# Patient Record
Sex: MALE | Race: WHITE | Employment: FULL TIME | URBAN - METROPOLITAN AREA
[De-identification: names, ages, dates, MRNs, and addresses within clinical notes are randomized per-mention and may not be internally consistent; named-entity substitution may affect disease eponyms.]

---

## 2017-05-01 RX ORDER — ALBUTEROL SULFATE 90 UG/1
2 AEROSOL, METERED RESPIRATORY (INHALATION) EVERY 6 HOURS PRN
Qty: 1 INHALER | Refills: 3 | Status: SHIPPED | OUTPATIENT
Start: 2017-05-01 | End: 2017-11-21 | Stop reason: SDUPTHER

## 2017-05-02 RX ORDER — ALBUTEROL SULFATE 2.5 MG/3ML
2.5 SOLUTION RESPIRATORY (INHALATION) EVERY 4 HOURS PRN
Qty: 30 EACH | Refills: 1 | Status: SHIPPED | OUTPATIENT
Start: 2017-05-02 | End: 2018-11-16

## 2017-06-07 ENCOUNTER — OFFICE VISIT (OUTPATIENT)
Dept: FAMILY MEDICINE CLINIC | Age: 32
End: 2017-06-07
Payer: COMMERCIAL

## 2017-06-07 ENCOUNTER — HOSPITAL ENCOUNTER (OUTPATIENT)
Age: 32
Discharge: HOME | End: 2017-06-07
Payer: COMMERCIAL

## 2017-06-07 DIAGNOSIS — Z91.09 ENVIRONMENTAL ALLERGIES: ICD-10-CM

## 2017-06-07 DIAGNOSIS — J45.40 MODERATE PERSISTENT ASTHMA WITHOUT COMPLICATION: Primary | ICD-10-CM

## 2017-06-07 DIAGNOSIS — J45.42 MODERATE PERSISTENT ASTHMA WITH STATUS ASTHMATICUS: ICD-10-CM

## 2017-06-07 LAB
ABSOLUTE EOS #: 0.3 K/UL (ref 0–0.4)
ABSOLUTE LYMPH #: 1.4 K/UL (ref 0.9–2.5)
ABSOLUTE MONO #: 0.6 K/UL (ref 0–1)
ALBUMIN SERPL-MCNC: 4.5 G/DL (ref 3.5–5.2)
ALBUMIN/GLOBULIN RATIO: ABNORMAL (ref 1–2.5)
ALP BLD-CCNC: 62 U/L (ref 40–129)
ALT SERPL-CCNC: 53 U/L (ref 5–41)
ANION GAP SERPL CALCULATED.3IONS-SCNC: 13 MMOL/L (ref 9–17)
AST SERPL-CCNC: 37 U/L
BASOPHILS # BLD: 0 %
BASOPHILS ABSOLUTE: 0 K/UL (ref 0–0.2)
BILIRUB SERPL-MCNC: 0.24 MG/DL (ref 0.3–1.2)
BUN BLDV-MCNC: 11 MG/DL (ref 6–20)
BUN/CREAT BLD: 11 (ref 9–20)
CALCIUM SERPL-MCNC: 10.1 MG/DL (ref 8.6–10.4)
CHLORIDE BLD-SCNC: 102 MMOL/L (ref 98–107)
CO2: 25 MMOL/L (ref 20–31)
CREAT SERPL-MCNC: 0.99 MG/DL (ref 0.7–1.2)
DIFFERENTIAL TYPE: YES
EOSINOPHILS RELATIVE PERCENT: 5 %
GFR AFRICAN AMERICAN: >60 ML/MIN
GFR NON-AFRICAN AMERICAN: >60 ML/MIN
GFR SERPL CREATININE-BSD FRML MDRD: ABNORMAL ML/MIN/{1.73_M2}
GFR SERPL CREATININE-BSD FRML MDRD: ABNORMAL ML/MIN/{1.73_M2}
GLUCOSE BLD-MCNC: 101 MG/DL (ref 70–99)
HCT VFR BLD CALC: 48 % (ref 41–53)
HEMOGLOBIN: 16.1 G/DL (ref 13.5–17.5)
LYMPHOCYTES # BLD: 18 %
MCH RBC QN AUTO: 30.6 PG (ref 26–34)
MCHC RBC AUTO-ENTMCNC: 33.6 G/DL (ref 31–37)
MCV RBC AUTO: 91.2 FL (ref 80–100)
MONOCYTES # BLD: 8 %
PDW BLD-RTO: 13.4 % (ref 12.1–15.2)
PLATELET # BLD: 198 K/UL (ref 140–450)
PLATELET ESTIMATE: NORMAL
PMV BLD AUTO: NORMAL FL (ref 6–12)
POTASSIUM SERPL-SCNC: 4.1 MMOL/L (ref 3.7–5.3)
RBC # BLD: 5.27 M/UL (ref 4.5–5.9)
RBC # BLD: NORMAL 10*6/UL
SEG NEUTROPHILS: 69 %
SEGMENTED NEUTROPHILS ABSOLUTE COUNT: 5.1 K/UL (ref 2.1–6.5)
SODIUM BLD-SCNC: 140 MMOL/L (ref 135–144)
TOTAL PROTEIN: 7.5 G/DL (ref 6.4–8.3)
WBC # BLD: 7.5 K/UL (ref 3.5–11)
WBC # BLD: NORMAL 10*3/UL

## 2017-06-07 PROCEDURE — 80053 COMPREHEN METABOLIC PANEL: CPT

## 2017-06-07 PROCEDURE — 36415 COLL VENOUS BLD VENIPUNCTURE: CPT

## 2017-06-07 PROCEDURE — 99214 OFFICE O/P EST MOD 30 MIN: CPT | Performed by: NURSE PRACTITIONER

## 2017-06-07 PROCEDURE — 85025 COMPLETE CBC W/AUTO DIFF WBC: CPT

## 2017-06-07 RX ORDER — MONTELUKAST SODIUM 10 MG/1
10 TABLET ORAL DAILY
Qty: 30 TABLET | Refills: 0 | Status: SHIPPED | OUTPATIENT
Start: 2017-06-07 | End: 2017-07-14 | Stop reason: SDUPTHER

## 2017-06-07 ASSESSMENT — ENCOUNTER SYMPTOMS
SINUS PRESSURE: 1
SORE THROAT: 1
COUGH: 1
HOARSE VOICE: 0
SHORTNESS OF BREATH: 1

## 2017-06-08 VITALS
SYSTOLIC BLOOD PRESSURE: 132 MMHG | HEIGHT: 71 IN | TEMPERATURE: 98.2 F | BODY MASS INDEX: 38.08 KG/M2 | HEART RATE: 88 BPM | OXYGEN SATURATION: 96 % | WEIGHT: 272 LBS | DIASTOLIC BLOOD PRESSURE: 84 MMHG

## 2017-06-08 ASSESSMENT — ENCOUNTER SYMPTOMS
SPUTUM PRODUCTION: 0
HEMOPTYSIS: 0

## 2017-06-16 ENCOUNTER — HOSPITAL ENCOUNTER (OUTPATIENT)
Dept: PULMONOLOGY | Age: 32
Discharge: HOME | End: 2017-06-16
Payer: COMMERCIAL

## 2017-06-16 VITALS — OXYGEN SATURATION: 98 %

## 2017-06-16 DIAGNOSIS — J45.40 MODERATE PERSISTENT ASTHMA WITHOUT COMPLICATION: ICD-10-CM

## 2017-06-16 PROCEDURE — 94060 EVALUATION OF WHEEZING: CPT

## 2017-06-16 PROCEDURE — 94060 EVALUATION OF WHEEZING: CPT | Performed by: INTERNAL MEDICINE

## 2017-06-16 PROCEDURE — 6360000002 HC RX W HCPCS: Performed by: NURSE PRACTITIONER

## 2017-06-16 PROCEDURE — 94726 PLETHYSMOGRAPHY LUNG VOLUMES: CPT

## 2017-06-16 PROCEDURE — 94729 DIFFUSING CAPACITY: CPT | Performed by: INTERNAL MEDICINE

## 2017-06-16 PROCEDURE — 94729 DIFFUSING CAPACITY: CPT

## 2017-06-16 RX ORDER — ALBUTEROL SULFATE 2.5 MG/3ML
2.5 SOLUTION RESPIRATORY (INHALATION) ONCE
Status: COMPLETED | OUTPATIENT
Start: 2017-06-16 | End: 2017-06-16

## 2017-06-16 RX ADMIN — ALBUTEROL SULFATE 2.5 MG: 2.5 SOLUTION RESPIRATORY (INHALATION) at 12:58

## 2017-06-20 ENCOUNTER — TELEPHONE (OUTPATIENT)
Dept: FAMILY MEDICINE CLINIC | Age: 32
End: 2017-06-20

## 2017-06-21 RX ORDER — FLUTICASONE FUROATE AND VILANTEROL 100; 25 UG/1; UG/1
1 POWDER RESPIRATORY (INHALATION) DAILY
Qty: 90 EACH | Refills: 0 | Status: SHIPPED | OUTPATIENT
Start: 2017-06-21 | End: 2017-09-26 | Stop reason: SDUPTHER

## 2017-06-30 ENCOUNTER — OFFICE VISIT (OUTPATIENT)
Dept: FAMILY MEDICINE CLINIC | Age: 32
End: 2017-06-30
Payer: COMMERCIAL

## 2017-06-30 VITALS
HEART RATE: 90 BPM | SYSTOLIC BLOOD PRESSURE: 126 MMHG | DIASTOLIC BLOOD PRESSURE: 82 MMHG | HEIGHT: 71 IN | WEIGHT: 273 LBS | OXYGEN SATURATION: 96 % | BODY MASS INDEX: 38.22 KG/M2 | TEMPERATURE: 98.3 F

## 2017-06-30 DIAGNOSIS — J45.30 MILD PERSISTENT ASTHMA WITHOUT COMPLICATION: Primary | ICD-10-CM

## 2017-06-30 DIAGNOSIS — J43.1 PANLOBULAR EMPHYSEMA (HCC): ICD-10-CM

## 2017-06-30 DIAGNOSIS — Z23 NEED FOR 23-POLYVALENT PNEUMOCOCCAL POLYSACCHARIDE VACCINE: ICD-10-CM

## 2017-06-30 PROCEDURE — 90471 IMMUNIZATION ADMIN: CPT | Performed by: NURSE PRACTITIONER

## 2017-06-30 PROCEDURE — 90732 PPSV23 VACC 2 YRS+ SUBQ/IM: CPT | Performed by: NURSE PRACTITIONER

## 2017-06-30 PROCEDURE — 99214 OFFICE O/P EST MOD 30 MIN: CPT | Performed by: NURSE PRACTITIONER

## 2017-06-30 ASSESSMENT — ENCOUNTER SYMPTOMS
COUGH: 0
DIFFICULTY BREATHING: 0
SORE THROAT: 0
CHEST TIGHTNESS: 0
RHINORRHEA: 0
SHORTNESS OF BREATH: 0
WHEEZING: 1

## 2017-06-30 ASSESSMENT — COPD QUESTIONNAIRES: COPD: 1

## 2017-07-14 DIAGNOSIS — Z91.09 ENVIRONMENTAL ALLERGIES: ICD-10-CM

## 2017-07-14 RX ORDER — MONTELUKAST SODIUM 10 MG/1
TABLET ORAL
Qty: 30 TABLET | Refills: 5 | Status: SHIPPED | OUTPATIENT
Start: 2017-07-14 | End: 2017-09-06 | Stop reason: SDUPTHER

## 2017-09-06 DIAGNOSIS — Z91.09 ENVIRONMENTAL ALLERGIES: ICD-10-CM

## 2017-09-06 RX ORDER — MONTELUKAST SODIUM 10 MG/1
10 TABLET ORAL NIGHTLY
Qty: 90 TABLET | Refills: 1 | Status: SHIPPED | OUTPATIENT
Start: 2017-09-06 | End: 2019-09-19 | Stop reason: SDUPTHER

## 2017-09-26 RX ORDER — FLUTICASONE FUROATE AND VILANTEROL 100; 25 UG/1; UG/1
1 POWDER RESPIRATORY (INHALATION) DAILY
Qty: 90 EACH | Refills: 0 | Status: SHIPPED | OUTPATIENT
Start: 2017-09-26 | End: 2018-01-17 | Stop reason: SDUPTHER

## 2017-11-20 NOTE — TELEPHONE ENCOUNTER
Patient left message asking for a new script for [employer] - patient uses  [address] Maintenance   Topic Date Due    HIV screen  12/20/1998    DTaP/Tdap/Td vaccine (1 - Tdap) 12/20/2002    Flu vaccine (1) 09/01/2017    Pneumococcal med risk  Completed             (applicable per patient's age: Cancer Screenings, Depression Screening, Fall Risk Screening, Immunizations)    AST (U/L)   Date Value   06/07/2017 37     ALT (U/L)   Date Value   06/07/2017 53 (H)     BUN (mg/dL)   Date Value   06/07/2017 11      (goal A1C is < 7)   (goal LDL is <100) need 30-50% reduction from baseline     BP Readings from Last 3 Encounters:   06/30/17 126/82   06/07/17 132/84   09/13/16 128/88    (goal /80)      All Future Testing planned in [facility]:      Next Visit Date:  Future Appointments  Date Time Provider Joselyn Alejandre   12/29/2017 4:00 PM Russ Mckee CNP [facility]            Patient Active Problem List:     Asthma

## 2017-11-21 RX ORDER — ALBUTEROL SULFATE 90 UG/1
2 AEROSOL, METERED RESPIRATORY (INHALATION) EVERY 6 HOURS PRN
Qty: 3 INHALER | Refills: 1 | Status: SHIPPED | OUTPATIENT
Start: 2017-11-21 | End: 2018-01-26 | Stop reason: SDUPTHER

## 2017-12-04 ENCOUNTER — TELEPHONE (OUTPATIENT)
Dept: FAMILY MEDICINE CLINIC | Age: 32
End: 2017-12-04

## 2017-12-04 NOTE — TELEPHONE ENCOUNTER
Patient is asking for a Jay Dyana. He states that he has a cough and congestion. Patient hasnt been seen since 17. Please advise.       Drug [address] Maintenance   Topic Date Due    HIV screen  1998    DTaP/Tdap/Td vaccine (1 - Tdap) 2002    Flu vaccine (1) 2017    Pneumococcal med risk  Completed             (applicable per patient's age: Cancer Screenings, Depression Screening, Fall Risk Screening, Immunizations)    AST (U/L)   Date Value   2017 37     ALT (U/L)   Date Value   2017 53 (H)     BUN (mg/dL)   Date Value   2017 11      (goal A1C is < 7)   (goal LDL is <100) need 30-50% reduction from baseline     BP Readings from Last 3 Encounters:   17 126/82   17 132/84   16 128/88    (goal /80)      All Future Testing planned in [facility]:      Next Visit Date:  Future Appointments  Date Time Provider Joselyn Alejandre   2017 4:00 PM Anastacia Whelan CNP [facility]            Patient Active Problem List:     Asthma

## 2018-01-17 RX ORDER — FLUTICASONE FUROATE AND VILANTEROL 100; 25 UG/1; UG/1
1 POWDER RESPIRATORY (INHALATION) DAILY
Qty: 3 EACH | Refills: 1 | Status: SHIPPED | OUTPATIENT
Start: 2018-01-17 | End: 2018-09-05 | Stop reason: SDUPTHER

## 2018-01-26 ENCOUNTER — OFFICE VISIT (OUTPATIENT)
Dept: FAMILY MEDICINE CLINIC | Age: 33
End: 2018-01-26
Payer: COMMERCIAL

## 2018-01-26 VITALS
OXYGEN SATURATION: 97 % | TEMPERATURE: 97.9 F | SYSTOLIC BLOOD PRESSURE: 124 MMHG | BODY MASS INDEX: 40.31 KG/M2 | HEART RATE: 86 BPM | DIASTOLIC BLOOD PRESSURE: 88 MMHG | WEIGHT: 289 LBS

## 2018-01-26 DIAGNOSIS — J45.20 MILD INTERMITTENT ASTHMA WITHOUT COMPLICATION: ICD-10-CM

## 2018-01-26 PROCEDURE — 99214 OFFICE O/P EST MOD 30 MIN: CPT | Performed by: NURSE PRACTITIONER

## 2018-01-26 RX ORDER — ALBUTEROL SULFATE 90 UG/1
2 AEROSOL, METERED RESPIRATORY (INHALATION) EVERY 6 HOURS PRN
Qty: 3 INHALER | Refills: 1 | Status: SHIPPED | OUTPATIENT
Start: 2018-01-26 | End: 2018-06-11 | Stop reason: SDUPTHER

## 2018-01-26 ASSESSMENT — ENCOUNTER SYMPTOMS
VOMITING: 0
NAUSEA: 0
DIARRHEA: 0
ORTHOPNEA: 0
CHEST TIGHTNESS: 0
SHORTNESS OF BREATH: 0
COUGH: 0
WHEEZING: 0

## 2018-01-26 ASSESSMENT — PATIENT HEALTH QUESTIONNAIRE - PHQ9
SUM OF ALL RESPONSES TO PHQ QUESTIONS 1-9: 0
SUM OF ALL RESPONSES TO PHQ9 QUESTIONS 1 & 2: 0
2. FEELING DOWN, DEPRESSED OR HOPELESS: 0
1. LITTLE INTEREST OR PLEASURE IN DOING THINGS: 0

## 2018-01-26 ASSESSMENT — COPD QUESTIONNAIRES: COPD: 1

## 2018-01-26 NOTE — PROGRESS NOTES
HPI Notes    Name: Beth Mustafa  : 1985         Chief Complaint:     Chief Complaint   Patient presents with    COPD     Patient here today for check up for his COPD, doing well with his breo. History of Present Illness:        COPD   There is no chest tightness, cough, shortness of breath or wheezing. This is a chronic problem. The current episode started more than 1 month ago. The problem occurs intermittently. The problem has been resolved. Pertinent negatives include no chest pain, dyspnea on exertion, fever or nasal congestion. His symptoms are aggravated by strenuous activity. His symptoms are alleviated by steroid inhaler. He reports significant improvement on treatment. His past medical history is significant for COPD. Past Medical History:     Past Medical History:   Diagnosis Date    Asthma     Chickenpox     Osteoarthritis     Tuberculosis       Reviewed all health maintenance requirements and ordered appropriate tests  Health Maintenance Due   Topic Date Due    HIV screen  1998    DTaP/Tdap/Td vaccine (1 - Tdap) 2002       Past Surgical History:     Past Surgical History:   Procedure Laterality Date    HERNIA REPAIR      TONSILLECTOMY AND ADENOIDECTOMY          Medications:       Prior to Admission medications    Medication Sig Start Date End Date Taking?  Authorizing Provider   albuterol sulfate HFA (PROAIR HFA) 108 (90 Base) MCG/ACT inhaler Inhale 2 puffs into the lungs every 6 hours as needed for Wheezing 18  Yes Marlon Costa CNP   fluticasone-vilanterol (BREO ELLIPTA) 100-25 MCG/INH AEPB inhaler Inhale 1 puff into the lungs daily 18  Yes Marlon Costa CNP   montelukast (SINGULAIR) 10 MG tablet Take 1 tablet by mouth nightly 17  Yes Marti Le CNP   albuterol (PROVENTIL) (2.5 MG/3ML) 0.083% nebulizer solution Take 3 mLs by nebulization every 4 hours as needed for Wheezing 17   Nile Shearer DO        Allergies: Review of patient's allergies indicates no known allergies. Social History:     Tobacco:    reports that he has never smoked. He has quit using smokeless tobacco. His smokeless tobacco use included Chew. Alcohol:      reports that he drinks alcohol. Drug Use:  reports that he does not use drugs. Family History:     Family History   Problem Relation Age of Onset    Adopted: Yes       Review of Systems:         Review of Systems   Constitutional: Negative for chills and fever. Respiratory: Negative for cough, shortness of breath and wheezing. Cardiovascular: Negative for chest pain, dyspnea on exertion, palpitations and orthopnea. Gastrointestinal: Negative for diarrhea, nausea and vomiting. Physical Exam:     Vitals:  /88   Pulse 86   Temp 97.9 °F (36.6 °C) (Oral)   Wt 289 lb (131.1 kg)   SpO2 97%    L/min   BMI 40.31 kg/m²       Physical Exam   Constitutional: Vital signs are normal. He appears well-developed and well-nourished. He is cooperative. Cardiovascular: Normal rate, regular rhythm, S1 normal and S2 normal.    Pulmonary/Chest: Effort normal and breath sounds normal. No respiratory distress. Skin: Skin is warm, dry and intact. Nursing note and vitals reviewed.             Data:     Lab Results   Component Value Date     06/07/2017    K 4.1 06/07/2017     06/07/2017    CO2 25 06/07/2017    BUN 11 06/07/2017    CREATININE 0.99 06/07/2017    GLUCOSE 101 06/07/2017    PROT 7.5 06/07/2017    LABALBU 4.5 06/07/2017    BILITOT 0.24 06/07/2017    ALKPHOS 62 06/07/2017    AST 37 06/07/2017    ALT 53 06/07/2017     Lab Results   Component Value Date    WBC 7.5 06/07/2017    RBC 5.27 06/07/2017    HGB 16.1 06/07/2017    HCT 48.0 06/07/2017    MCV 91.2 06/07/2017    MCH 30.6 06/07/2017    MCHC 33.6 06/07/2017    RDW 13.4 06/07/2017     06/07/2017    MPV NOT REPORTED 06/07/2017     No results found for: TSH  No results found for: CHOL, HDL, PSA, adherence  Reviewed prior labs and health maintenance. Continue current medications, diet and exercise. Discussed use, benefit, and side effects of prescribed medications. Barriers to medication compliance addressed. Patient given educational materials - see patient instructions. All patient questions answered. Patient voiced understanding.

## 2018-06-11 RX ORDER — ALBUTEROL SULFATE 90 UG/1
2 AEROSOL, METERED RESPIRATORY (INHALATION) EVERY 6 HOURS PRN
Qty: 3 INHALER | Refills: 1 | Status: SHIPPED | OUTPATIENT
Start: 2018-06-11 | End: 2018-11-16 | Stop reason: SDUPTHER

## 2018-09-05 RX ORDER — FLUTICASONE FUROATE AND VILANTEROL 100; 25 UG/1; UG/1
1 POWDER RESPIRATORY (INHALATION) DAILY
Qty: 3 EACH | Refills: 0 | Status: SHIPPED | OUTPATIENT
Start: 2018-09-05 | End: 2018-12-03 | Stop reason: SDUPTHER

## 2018-09-25 ENCOUNTER — OFFICE VISIT (OUTPATIENT)
Dept: FAMILY MEDICINE CLINIC | Age: 33
End: 2018-09-25
Payer: COMMERCIAL

## 2018-09-25 VITALS
BODY MASS INDEX: 39.62 KG/M2 | DIASTOLIC BLOOD PRESSURE: 82 MMHG | HEART RATE: 92 BPM | SYSTOLIC BLOOD PRESSURE: 138 MMHG | WEIGHT: 283 LBS | TEMPERATURE: 99.3 F | HEIGHT: 71 IN | OXYGEN SATURATION: 96 %

## 2018-09-25 DIAGNOSIS — J02.8 ACUTE PHARYNGITIS DUE TO OTHER SPECIFIED ORGANISMS: Primary | ICD-10-CM

## 2018-09-25 PROCEDURE — 99213 OFFICE O/P EST LOW 20 MIN: CPT | Performed by: FAMILY MEDICINE

## 2018-09-25 RX ORDER — AZITHROMYCIN 250 MG/1
TABLET, FILM COATED ORAL
Qty: 1 PACKET | Refills: 0 | Status: SHIPPED | OUTPATIENT
Start: 2018-09-25 | End: 2019-03-14 | Stop reason: ALTCHOICE

## 2018-09-25 ASSESSMENT — ENCOUNTER SYMPTOMS
COUGH: 0
WHEEZING: 0
VOMITING: 0
NAUSEA: 0
SORE THROAT: 1
SHORTNESS OF BREATH: 0
FACIAL SWELLING: 0
EYE DISCHARGE: 0
EYE REDNESS: 0

## 2018-09-25 NOTE — PROGRESS NOTES
MG/3ML) 0.083% nebulizer solution Take 3 mLs by nebulization every 4 hours as needed for Wheezing 5/2/17   Rosalina Shearer, DO        Allergies:       Patient has no known allergies. Social History:     Tobacco:    reports that he has never smoked. He has quit using smokeless tobacco. His smokeless tobacco use included Chew. Alcohol:      reports that he drinks alcohol. Drug Use:  reports that he does not use drugs. Family History:     Family History   Problem Relation Age of Onset    Adopted: Yes       Review of Systems:       Review of Systems   Constitutional: Negative for chills and fever. HENT: Positive for sore throat. Negative for congestion and facial swelling. Eyes: Negative for discharge and redness. Respiratory: Negative for cough, shortness of breath and wheezing. Gastrointestinal: Negative for nausea and vomiting. Skin: Negative for rash. Neurological: Negative for headaches. Physical Exam:     Physical Exam   Constitutional: He appears well-developed and well-nourished. HENT:   Head: Normocephalic and atraumatic. Nose: Mucosal edema present. Right sinus exhibits no maxillary sinus tenderness and no frontal sinus tenderness. Left sinus exhibits no maxillary sinus tenderness and no frontal sinus tenderness. Eyes: Conjunctivae are normal. Right eye exhibits no discharge. Left eye exhibits no discharge. Neck: Neck supple. Pulmonary/Chest: Effort normal and breath sounds normal. No respiratory distress. He has no wheezes. Lymphadenopathy:     He has no cervical adenopathy. Skin: No rash noted. No erythema. Vitals reviewed.       Vitals:  /82   Pulse 92   Temp 99.3 °F (37.4 °C)   Ht 5' 11\" (1.803 m)   Wt 283 lb (128.4 kg)   SpO2 96%   BMI 39.47 kg/m²       Data:     Lab Results   Component Value Date     06/07/2017    K 4.1 06/07/2017     06/07/2017    CO2 25 06/07/2017    BUN 11 06/07/2017    CREATININE 0.99 06/07/2017    GLUCOSE 101 06/07/2017    PROT 7.5 06/07/2017    LABALBU 4.5 06/07/2017    BILITOT 0.24 06/07/2017    ALKPHOS 62 06/07/2017    AST 37 06/07/2017    ALT 53 06/07/2017     Lab Results   Component Value Date    WBC 7.5 06/07/2017    RBC 5.27 06/07/2017    HGB 16.1 06/07/2017    HCT 48.0 06/07/2017    MCV 91.2 06/07/2017    MCH 30.6 06/07/2017    MCHC 33.6 06/07/2017    RDW 13.4 06/07/2017     06/07/2017    MPV NOT REPORTED 06/07/2017     No results found for: TSH  No results found for: CHOL, HDL, PSA, LABA1C       Assessment/Plan:       1. Acute pharyngitis due to other specified organisms  Take all antibiotics, increase rest and fluids. F/U 4-5d if not better or sooner if worse. All questions answered. Pt also told to try take claritin daily for 3-4d          No Follow-up on file.       Electronically signed by Altaf Ortiz MD on 9/25/2018 at 4:36 PM

## 2018-09-25 NOTE — PATIENT INSTRUCTIONS
SURVEY:    You may be receiving a survey from [employer] regarding your visit today. Please complete the survey to enable us to provide the highest quality of care to you and your family. If you cannot score us a very good on any question, please call the office to discuss how we could have made your experience a very good one. Thank you.

## 2018-11-16 RX ORDER — ALBUTEROL SULFATE 90 UG/1
2 AEROSOL, METERED RESPIRATORY (INHALATION) EVERY 6 HOURS PRN
Qty: 3 INHALER | Refills: 1 | Status: SHIPPED | OUTPATIENT
Start: 2018-11-16 | End: 2019-07-15 | Stop reason: SDUPTHER

## 2018-12-03 RX ORDER — FLUTICASONE FUROATE AND VILANTEROL 100; 25 UG/1; UG/1
1 POWDER RESPIRATORY (INHALATION) DAILY
Qty: 3 EACH | Refills: 0 | Status: SHIPPED | OUTPATIENT
Start: 2018-12-03 | End: 2019-02-19 | Stop reason: SDUPTHER

## 2019-02-19 RX ORDER — FLUTICASONE FUROATE AND VILANTEROL 100; 25 UG/1; UG/1
1 POWDER RESPIRATORY (INHALATION) DAILY
Qty: 3 EACH | Refills: 1 | Status: SHIPPED | OUTPATIENT
Start: 2019-02-19 | End: 2019-06-17 | Stop reason: SDUPTHER

## 2019-03-14 ENCOUNTER — OFFICE VISIT (OUTPATIENT)
Dept: FAMILY MEDICINE CLINIC | Age: 34
End: 2019-03-14
Payer: COMMERCIAL

## 2019-03-14 VITALS
DIASTOLIC BLOOD PRESSURE: 86 MMHG | OXYGEN SATURATION: 97 % | WEIGHT: 286 LBS | BODY MASS INDEX: 39.89 KG/M2 | TEMPERATURE: 97.8 F | SYSTOLIC BLOOD PRESSURE: 138 MMHG | HEART RATE: 78 BPM

## 2019-03-14 DIAGNOSIS — J45.30 MILD PERSISTENT ASTHMA WITHOUT COMPLICATION: Primary | ICD-10-CM

## 2019-03-14 PROCEDURE — 99214 OFFICE O/P EST MOD 30 MIN: CPT | Performed by: NURSE PRACTITIONER

## 2019-03-14 ASSESSMENT — PATIENT HEALTH QUESTIONNAIRE - PHQ9
2. FEELING DOWN, DEPRESSED OR HOPELESS: 0
SUM OF ALL RESPONSES TO PHQ QUESTIONS 1-9: 0
1. LITTLE INTEREST OR PLEASURE IN DOING THINGS: 0
SUM OF ALL RESPONSES TO PHQ QUESTIONS 1-9: 0
SUM OF ALL RESPONSES TO PHQ9 QUESTIONS 1 & 2: 0

## 2019-03-14 ASSESSMENT — ENCOUNTER SYMPTOMS
COUGH: 0
SHORTNESS OF BREATH: 0
VOMITING: 0
NAUSEA: 0
DIARRHEA: 0
SPUTUM PRODUCTION: 0

## 2019-06-17 RX ORDER — FLUTICASONE FUROATE AND VILANTEROL 100; 25 UG/1; UG/1
1 POWDER RESPIRATORY (INHALATION) DAILY
Qty: 3 EACH | Refills: 1 | Status: SHIPPED | OUTPATIENT
Start: 2019-06-17 | End: 2019-09-19 | Stop reason: SDUPTHER

## 2019-07-15 ENCOUNTER — TELEPHONE (OUTPATIENT)
Dept: FAMILY MEDICINE CLINIC | Age: 34
End: 2019-07-15

## 2019-07-15 RX ORDER — ALBUTEROL SULFATE 90 UG/1
2 AEROSOL, METERED RESPIRATORY (INHALATION) EVERY 6 HOURS PRN
Qty: 3 INHALER | Refills: 1 | Status: SHIPPED | OUTPATIENT
Start: 2019-07-15 | End: 2020-03-12 | Stop reason: SDUPTHER

## 2019-07-15 NOTE — TELEPHONE ENCOUNTER
Patient called in asking for albuterol refill    [facility]     Pending    Last ov 03/2019    Future 09/2019    Health Maintenance   Topic Date Due    HIV screen  12/20/1998    DTaP/Tdap/Td vaccine (1 - Tdap) 12/20/2002    Flu vaccine (1) 03/14/2020 (Originally 9/1/2019)    Pneumococcal 0-64 years Vaccine  Completed             (applicable per patient's age: Cancer Screenings, Depression Screening, Fall Risk Screening, Immunizations)    AST (U/L)   Date Value   06/07/2017 37     ALT (U/L)   Date Value   06/07/2017 53 (H)     BUN (mg/dL)   Date Value   06/07/2017 11      (goal A1C is < 7)   (goal LDL is <100) need 30-50% reduction from baseline     BP Readings from Last 3 Encounters:   03/14/19 138/86   09/25/18 138/82   01/26/18 124/88    (goal /80)      All Future Testing planned in [facility]:      Next Visit Date:  Future Appointments   Date Time Provider Joselyn Alejandre   9/19/2019  3:40 PM ELIZABETH Smith - [facility]            Patient Active Problem List:     Asthma     Mild intermittent asthma without complication

## 2019-09-19 ENCOUNTER — OFFICE VISIT (OUTPATIENT)
Dept: FAMILY MEDICINE CLINIC | Age: 34
End: 2019-09-19
Payer: COMMERCIAL

## 2019-09-19 VITALS
HEART RATE: 88 BPM | DIASTOLIC BLOOD PRESSURE: 80 MMHG | BODY MASS INDEX: 38.91 KG/M2 | SYSTOLIC BLOOD PRESSURE: 132 MMHG | TEMPERATURE: 98.4 F | OXYGEN SATURATION: 97 % | WEIGHT: 279 LBS

## 2019-09-19 DIAGNOSIS — Z91.09 ENVIRONMENTAL ALLERGIES: ICD-10-CM

## 2019-09-19 DIAGNOSIS — J45.30 MILD PERSISTENT ASTHMA WITHOUT COMPLICATION: Primary | ICD-10-CM

## 2019-09-19 PROCEDURE — 96372 THER/PROPH/DIAG INJ SC/IM: CPT | Performed by: NURSE PRACTITIONER

## 2019-09-19 PROCEDURE — 99214 OFFICE O/P EST MOD 30 MIN: CPT | Performed by: NURSE PRACTITIONER

## 2019-09-19 RX ORDER — TRIAMCINOLONE ACETONIDE 40 MG/ML
40 INJECTION, SUSPENSION INTRA-ARTICULAR; INTRAMUSCULAR ONCE
Status: COMPLETED | OUTPATIENT
Start: 2019-09-19 | End: 2019-09-19

## 2019-09-19 RX ORDER — FLUTICASONE FUROATE AND VILANTEROL 100; 25 UG/1; UG/1
1 POWDER RESPIRATORY (INHALATION) DAILY
Qty: 60 EACH | Refills: 2 | Status: SHIPPED | OUTPATIENT
Start: 2019-09-19 | End: 2020-03-12 | Stop reason: SDUPTHER

## 2019-09-19 RX ORDER — MONTELUKAST SODIUM 10 MG/1
10 TABLET ORAL NIGHTLY
Qty: 90 TABLET | Refills: 1 | Status: SHIPPED | OUTPATIENT
Start: 2019-09-19 | End: 2020-03-12 | Stop reason: SDUPTHER

## 2019-09-19 RX ADMIN — TRIAMCINOLONE ACETONIDE 40 MG: 40 INJECTION, SUSPENSION INTRA-ARTICULAR; INTRAMUSCULAR at 16:29

## 2019-09-19 ASSESSMENT — ENCOUNTER SYMPTOMS
NAUSEA: 0
COUGH: 0
SHORTNESS OF BREATH: 1
DIARRHEA: 0
VOMITING: 0

## 2019-09-19 NOTE — PROGRESS NOTES
No results found for: TSH  No results found for: CHOL, HDL, PSA, LABA1C       Assessment & Plan        Diagnosis Orders   1. Mild persistent asthma without complication     2. Environmental allergies  montelukast (SINGULAIR) 10 MG tablet     Doing well with Breo and prn use of albuterol. Start singulair. Continue regular exercise. Patient verbalizes understanding and agreement with plan. All questions answered. If symptoms do not resolve or worsen, return to office. Completed Refills   Requested Prescriptions     Signed Prescriptions Disp Refills    montelukast (SINGULAIR) 10 MG tablet 90 tablet 1     Sig: Take 1 tablet by mouth nightly    fluticasone-vilanterol (BREO ELLIPTA) 100-25 MCG/INH AEPB inhaler 60 each 2     Sig: Inhale 1 puff into the lungs daily     No follow-ups on file. Orders Placed This Encounter   Medications    montelukast (SINGULAIR) 10 MG tablet     Sig: Take 1 tablet by mouth nightly     Dispense:  90 tablet     Refill:  1    triamcinolone acetonide (KENALOG-40) injection 40 mg    fluticasone-vilanterol (BREO ELLIPTA) 100-25 MCG/INH AEPB inhaler     Sig: Inhale 1 puff into the lungs daily     Dispense:  60 each     Refill:  2     No orders of the defined types were placed in this encounter. Patient Instructions     SURVEY:    You may be receiving a survey from [employer] regarding your visit today. Please complete the survey to enable us to provide the highest quality of care to you and your family. If you cannot score us a very good on any question, please call the office to discuss how we could have made your experience a very good one. Thank you.       Electronically signed by ELIZABETH Castro CNP on 9/19/2019 at 4:21 PM           Completed Refills      Requested Prescriptions     Signed Prescriptions Disp Refills    montelukast (SINGULAIR) 10 MG tablet 90 tablet 1     Sig: Take 1 tablet by mouth nightly    fluticasone-vilanterol (BREO

## 2019-09-19 NOTE — PATIENT INSTRUCTIONS
SURVEY:    You may be receiving a survey from [employer] regarding your visit today. Please complete the survey to enable us to provide the highest quality of care to you and your family. If you cannot score us a very good on any question, please call the office to discuss how we could have made your experience a very good one. Thank you.

## 2020-03-12 ENCOUNTER — OFFICE VISIT (OUTPATIENT)
Dept: FAMILY MEDICINE CLINIC | Age: 35
End: 2020-03-12
Payer: COMMERCIAL

## 2020-03-12 VITALS
WEIGHT: 291 LBS | HEIGHT: 71 IN | HEART RATE: 105 BPM | BODY MASS INDEX: 40.74 KG/M2 | SYSTOLIC BLOOD PRESSURE: 138 MMHG | TEMPERATURE: 99.2 F | DIASTOLIC BLOOD PRESSURE: 80 MMHG | OXYGEN SATURATION: 99 %

## 2020-03-12 PROCEDURE — 99213 OFFICE O/P EST LOW 20 MIN: CPT | Performed by: FAMILY MEDICINE

## 2020-03-12 RX ORDER — AZITHROMYCIN 250 MG/1
250 TABLET, FILM COATED ORAL SEE ADMIN INSTRUCTIONS
Qty: 6 TABLET | Refills: 0 | Status: SHIPPED | OUTPATIENT
Start: 2020-03-12 | End: 2020-03-17

## 2020-03-12 RX ORDER — ALBUTEROL SULFATE 90 UG/1
2 AEROSOL, METERED RESPIRATORY (INHALATION) EVERY 6 HOURS PRN
Qty: 3 INHALER | Refills: 1 | Status: SHIPPED | OUTPATIENT
Start: 2020-03-12 | End: 2020-04-21

## 2020-03-12 RX ORDER — FLUTICASONE FUROATE AND VILANTEROL 100; 25 UG/1; UG/1
1 POWDER RESPIRATORY (INHALATION) DAILY
Qty: 60 EACH | Refills: 2 | Status: SHIPPED | OUTPATIENT
Start: 2020-03-12 | End: 2020-03-20

## 2020-03-12 RX ORDER — FLUTICASONE PROPIONATE 50 MCG
1 SPRAY, SUSPENSION (ML) NASAL DAILY
Qty: 2 BOTTLE | Refills: 1 | Status: SHIPPED | OUTPATIENT
Start: 2020-03-12

## 2020-03-12 RX ORDER — MONTELUKAST SODIUM 10 MG/1
10 TABLET ORAL NIGHTLY
Qty: 90 TABLET | Refills: 1 | Status: SHIPPED | OUTPATIENT
Start: 2020-03-12 | End: 2021-05-28 | Stop reason: SDUPTHER

## 2020-03-12 ASSESSMENT — PATIENT HEALTH QUESTIONNAIRE - PHQ9
SUM OF ALL RESPONSES TO PHQ QUESTIONS 1-9: 0
SUM OF ALL RESPONSES TO PHQ9 QUESTIONS 1 & 2: 0
1. LITTLE INTEREST OR PLEASURE IN DOING THINGS: 0
SUM OF ALL RESPONSES TO PHQ QUESTIONS 1-9: 0
2. FEELING DOWN, DEPRESSED OR HOPELESS: 0

## 2020-03-12 ASSESSMENT — ENCOUNTER SYMPTOMS
COUGH: 0
SINUS PAIN: 0
RHINORRHEA: 1
BACK PAIN: 0
SINUS PRESSURE: 1
EYE DISCHARGE: 1
SORE THROAT: 1
EYE ITCHING: 0

## 2020-03-12 NOTE — PROGRESS NOTES
light-headedness and headaches. Physical Exam:  Physical Exam  Vitals signs and nursing note reviewed. Constitutional:       General: He is not in acute distress. Appearance: He is not ill-appearing. HENT:      Head: Normocephalic and atraumatic. Right Ear: Tympanic membrane normal.      Left Ear: Tympanic membrane normal.      Nose: Congestion and rhinorrhea present. Mouth/Throat:      Mouth: Mucous membranes are moist.      Pharynx: No posterior oropharyngeal erythema. Eyes:      Conjunctiva/sclera: Conjunctivae normal.   Neck:      Musculoskeletal: Neck supple. Cardiovascular:      Rate and Rhythm: Normal rate and regular rhythm. Heart sounds: Normal heart sounds. Pulmonary:      Effort: Pulmonary effort is normal.      Breath sounds: Normal breath sounds. Lymphadenopathy:      Cervical: No cervical adenopathy. Skin:     General: Skin is warm and dry. Neurological:      Mental Status: He is alert and oriented to person, place, and time.           /80   Pulse 105   Temp 99.2 °F (37.3 °C)   Ht 5' 11\" (1.803 m)   Wt 291 lb (132 kg)   SpO2 99%   BMI 40.59 kg/m²     RECENT LABS:  Lab Results   Component Value Date     06/07/2017    K 4.1 06/07/2017     06/07/2017    CO2 25 06/07/2017    BUN 11 06/07/2017    CREATININE 0.99 06/07/2017    GLUCOSE 101 06/07/2017    PROT 7.5 06/07/2017    LABALBU 4.5 06/07/2017    BILITOT 0.24 06/07/2017    ALKPHOS 62 06/07/2017    AST 37 06/07/2017    ALT 53 06/07/2017     Lab Results   Component Value Date    WBC 7.5 06/07/2017    RBC 5.27 06/07/2017    HGB 16.1 06/07/2017    HCT 48.0 06/07/2017    MCV 91.2 06/07/2017    MCH 30.6 06/07/2017    MCHC 33.6 06/07/2017    RDW 13.4 06/07/2017     06/07/2017    MPV NOT REPORTED 06/07/2017     No results found for: TSH  No results found for: CHOL, HDL, PSA, LABA1C    Assessment & Plan:  Sinusitis, allergic rhinitis    Z russell  flonase nasal spray      Electronically signed by

## 2020-03-20 RX ORDER — FLUTICASONE FUROATE AND VILANTEROL TRIFENATATE 100; 25 UG/1; UG/1
POWDER RESPIRATORY (INHALATION)
Qty: 1 EACH | Refills: 2 | Status: SHIPPED | OUTPATIENT
Start: 2020-03-20 | End: 2020-04-21 | Stop reason: SDUPTHER

## 2020-04-19 ENCOUNTER — HOSPITAL ENCOUNTER (EMERGENCY)
Age: 35
Discharge: HOME | End: 2020-04-19
Attending: FAMILY MEDICINE
Payer: COMMERCIAL

## 2020-04-19 VITALS
RESPIRATION RATE: 18 BRPM | DIASTOLIC BLOOD PRESSURE: 89 MMHG | HEART RATE: 104 BPM | TEMPERATURE: 96.9 F | SYSTOLIC BLOOD PRESSURE: 165 MMHG | BODY MASS INDEX: 38.5 KG/M2 | WEIGHT: 275 LBS | OXYGEN SATURATION: 96 % | HEIGHT: 71 IN

## 2020-04-19 PROCEDURE — 6360000002 HC RX W HCPCS: Performed by: FAMILY MEDICINE

## 2020-04-19 PROCEDURE — 99282 EMERGENCY DEPT VISIT SF MDM: CPT

## 2020-04-19 PROCEDURE — 64450 NJX AA&/STRD OTHER PN/BRANCH: CPT

## 2020-04-19 PROCEDURE — 90471 IMMUNIZATION ADMIN: CPT | Performed by: FAMILY MEDICINE

## 2020-04-19 PROCEDURE — 90715 TDAP VACCINE 7 YRS/> IM: CPT | Performed by: FAMILY MEDICINE

## 2020-04-19 PROCEDURE — 2500000003 HC RX 250 WO HCPCS: Performed by: FAMILY MEDICINE

## 2020-04-19 RX ORDER — LIDOCAINE HYDROCHLORIDE 10 MG/ML
5 INJECTION, SOLUTION INFILTRATION; PERINEURAL ONCE
Status: COMPLETED | OUTPATIENT
Start: 2020-04-19 | End: 2020-04-19

## 2020-04-19 RX ORDER — CEPHALEXIN 500 MG/1
500 CAPSULE ORAL 4 TIMES DAILY
Qty: 40 CAPSULE | Refills: 0 | Status: SHIPPED | OUTPATIENT
Start: 2020-04-19 | End: 2020-04-29

## 2020-04-19 RX ADMIN — LIDOCAINE HYDROCHLORIDE 5 ML: 10 INJECTION, SOLUTION INFILTRATION; PERINEURAL at 13:27

## 2020-04-19 RX ADMIN — TETANUS TOXOID, REDUCED DIPHTHERIA TOXOID AND ACELLULAR PERTUSSIS VACCINE, ADSORBED 0.5 ML: 5; 2.5; 8; 8; 2.5 SUSPENSION INTRAMUSCULAR at 13:28

## 2020-04-19 ASSESSMENT — PAIN DESCRIPTION - LOCATION: LOCATION: FINGER (COMMENT WHICH ONE)

## 2020-04-19 ASSESSMENT — PAIN SCALES - GENERAL
PAINLEVEL_OUTOF10: 10
PAINLEVEL_OUTOF10: 10

## 2020-04-19 ASSESSMENT — PAIN DESCRIPTION - PAIN TYPE: TYPE: ACUTE PAIN

## 2020-04-19 ASSESSMENT — PAIN DESCRIPTION - ORIENTATION: ORIENTATION: RIGHT

## 2020-04-19 NOTE — ED PROVIDER NOTES
voice recognition program.  Efforts were made to edit the dictations but occasionally words are mis-transcribed.)    MD Roxann Mcfadden MD  04/19/20 [id number]

## 2020-04-20 ENCOUNTER — CARE COORDINATION (OUTPATIENT)
Dept: CARE COORDINATION | Age: 35
End: 2020-04-20

## 2020-04-21 ENCOUNTER — TELEPHONE (OUTPATIENT)
Dept: FAMILY MEDICINE CLINIC | Age: 35
End: 2020-04-21

## 2020-04-21 RX ORDER — FLUTICASONE FUROATE AND VILANTEROL TRIFENATATE 100; 25 UG/1; UG/1
POWDER RESPIRATORY (INHALATION)
Qty: 3 EACH | Refills: 1 | Status: SHIPPED | OUTPATIENT
Start: 2020-04-21 | End: 2020-06-09 | Stop reason: SDUPTHER

## 2020-04-21 RX ORDER — ALBUTEROL SULFATE 90 UG/1
AEROSOL, METERED RESPIRATORY (INHALATION)
Qty: 54 G | Refills: 1 | Status: SHIPPED | OUTPATIENT
Start: 2020-04-21 | End: 2020-12-02

## 2020-04-21 NOTE — TELEPHONE ENCOUNTER
Patient asking for [facility] also - can we make sure these 2 medications are sent as 90 days scripts - last time they were not and he got charged extra and only got 1 month worth

## 2020-05-04 ENCOUNTER — CARE COORDINATION (OUTPATIENT)
Dept: CARE COORDINATION | Age: 35
End: 2020-05-04

## 2020-05-04 NOTE — CARE COORDINATION
You Patient resolved from the Care Transitions episode on 5/4/2020  Patient/family has been provided the following resources and education related to COVID-19:                         Signs, symptoms and red flags related to COVID-19            CDC exposure and quarantine guidelines            [facility] exposure contact - [phone]            Contact for their local Department of Health                 Patient currently reports that the following symptoms have improved:  no new/worsening symptoms     No further outreach scheduled with this CTN/ACM. Episode of Care resolved. Patient has this CTN/ACM contact information if future needs arise.

## 2020-06-09 RX ORDER — FLUTICASONE FUROATE AND VILANTEROL TRIFENATATE 100; 25 UG/1; UG/1
POWDER RESPIRATORY (INHALATION)
Qty: 3 EACH | Refills: 1 | Status: SHIPPED | OUTPATIENT
Start: 2020-06-09 | End: 2021-01-05 | Stop reason: SDUPTHER

## 2020-08-11 ENCOUNTER — NURSE ONLY (OUTPATIENT)
Dept: FAMILY MEDICINE CLINIC | Age: 35
End: 2020-08-11
Payer: COMMERCIAL

## 2020-08-11 ENCOUNTER — TELEPHONE (OUTPATIENT)
Dept: FAMILY MEDICINE CLINIC | Age: 35
End: 2020-08-11

## 2020-08-11 PROCEDURE — 96372 THER/PROPH/DIAG INJ SC/IM: CPT | Performed by: NURSE PRACTITIONER

## 2020-08-11 RX ORDER — TRIAMCINOLONE ACETONIDE 40 MG/ML
40 INJECTION, SUSPENSION INTRA-ARTICULAR; INTRAMUSCULAR ONCE
Status: COMPLETED | OUTPATIENT
Start: 2020-08-11 | End: 2020-08-11

## 2020-08-11 RX ADMIN — TRIAMCINOLONE ACETONIDE 40 MG: 40 INJECTION, SUSPENSION INTRA-ARTICULAR; INTRAMUSCULAR at 15:41

## 2020-08-11 NOTE — TELEPHONE ENCOUNTER
Mady Cordero said his allergies are bothering him and he would like an allergy shot. He said he played with his buddies goats last night and this morning his eyes were swollen and watering. Please let Mady Cordero know. Health Maintenance   Topic Date Due    Varicella vaccine (1 of 2 - 2-dose childhood series) 12/20/1984    HIV screen  12/20/1998    Flu vaccine (1) 09/01/2020    DTaP/Tdap/Td vaccine (2 - Td) 04/19/2030    Pneumococcal 0-64 years Vaccine  Completed    Hepatitis A vaccine  Aged Out    Hepatitis B vaccine  Aged Out    Hib vaccine  Aged Out    Meningococcal (ACWY) vaccine  Aged Out             (applicable per patient's age: Cancer Screenings, Depression Screening, Fall Risk Screening, Immunizations)    AST (U/L)   Date Value   06/07/2017 37     ALT (U/L)   Date Value   06/07/2017 53 (H)     BUN (mg/dL)   Date Value   06/07/2017 11      (goal A1C is < 7)   (goal LDL is <100) need 30-50% reduction from baseline     BP Readings from Last 3 Encounters:   04/19/20 (!) 165/89   03/12/20 138/80   09/19/19 132/80    (goal /80)      All Future Testing planned in [facility]:      Next Visit Date:  No future appointments.          Patient Active Problem List:     Asthma     Mild intermittent asthma without complication

## 2020-12-02 RX ORDER — ALBUTEROL SULFATE 90 UG/1
AEROSOL, METERED RESPIRATORY (INHALATION)
Qty: 54 G | Refills: 1 | Status: SHIPPED | OUTPATIENT
Start: 2020-12-02 | End: 2022-07-07 | Stop reason: SDUPTHER

## 2021-01-05 RX ORDER — FLUTICASONE FUROATE AND VILANTEROL TRIFENATATE 100; 25 UG/1; UG/1
POWDER RESPIRATORY (INHALATION)
Qty: 3 EACH | Refills: 1 | Status: SHIPPED | OUTPATIENT
Start: 2021-01-05 | End: 2021-05-28 | Stop reason: SDUPTHER

## 2021-01-05 NOTE — TELEPHONE ENCOUNTER
Last OV: 3/12/2020 for siniusitis     Next scheduled apt: Visit date not found    Patient is requesting a refill of Breo . Rx pending .

## 2021-01-05 NOTE — TELEPHONE ENCOUNTER
Patient asking for a new script for Breo - patient uses  [address] Maintenance   Topic Date Due    Hepatitis C screen  1985    Varicella vaccine (1 of 2 - 2-dose childhood series) 12/20/1984    HIV screen  12/20/1998    Flu vaccine (1) 09/01/2020    DTaP/Tdap/Td vaccine (2 - Td) 04/19/2030    Pneumococcal 0-64 years Vaccine  Completed    Hepatitis A vaccine  Aged Out    Hepatitis B vaccine  Aged Out    Hib vaccine  Aged Out    Meningococcal (ACWY) vaccine  Aged Out             (applicable per patient's age: Cancer Screenings, Depression Screening, Fall Risk Screening, Immunizations)    AST (U/L)   Date Value   06/07/2017 37     ALT (U/L)   Date Value   06/07/2017 53 (H)     BUN (mg/dL)   Date Value   06/07/2017 11      (goal A1C is < 7)   (goal LDL is <100) need 30-50% reduction from baseline     BP Readings from Last 3 Encounters:   04/19/20 (!) 165/89   03/12/20 138/80   09/19/19 132/80    (goal /80)      All Future Testing planned in [facility]:      Next Visit Date:  No future appointments.          Patient Active Problem List:     Asthma     Mild intermittent asthma without complication

## 2021-05-28 ENCOUNTER — OFFICE VISIT (OUTPATIENT)
Dept: FAMILY MEDICINE CLINIC | Age: 36
End: 2021-05-28
Payer: COMMERCIAL

## 2021-05-28 VITALS
SYSTOLIC BLOOD PRESSURE: 138 MMHG | BODY MASS INDEX: 41.52 KG/M2 | DIASTOLIC BLOOD PRESSURE: 84 MMHG | HEART RATE: 74 BPM | TEMPERATURE: 98 F | OXYGEN SATURATION: 98 % | HEIGHT: 70 IN | WEIGHT: 290 LBS

## 2021-05-28 DIAGNOSIS — J45.30 MILD PERSISTENT ASTHMA WITHOUT COMPLICATION: ICD-10-CM

## 2021-05-28 DIAGNOSIS — Z91.09 ENVIRONMENTAL ALLERGIES: ICD-10-CM

## 2021-05-28 DIAGNOSIS — Z02.89 EXAMINATION, PHYSICAL, EMPLOYEE: Primary | ICD-10-CM

## 2021-05-28 PROBLEM — J45.20 MILD INTERMITTENT ASTHMA WITHOUT COMPLICATION: Status: RESOLVED | Noted: 2018-01-26 | Resolved: 2021-05-28

## 2021-05-28 PROCEDURE — 99395 PREV VISIT EST AGE 18-39: CPT | Performed by: NURSE PRACTITIONER

## 2021-05-28 RX ORDER — FLUTICASONE FUROATE AND VILANTEROL TRIFENATATE 100; 25 UG/1; UG/1
POWDER RESPIRATORY (INHALATION)
Qty: 3 EACH | Refills: 1 | Status: SHIPPED | OUTPATIENT
Start: 2021-05-28 | End: 2022-01-13

## 2021-05-28 RX ORDER — MONTELUKAST SODIUM 10 MG/1
10 TABLET ORAL NIGHTLY
Qty: 90 TABLET | Refills: 1 | Status: SHIPPED | OUTPATIENT
Start: 2021-05-28

## 2021-05-28 SDOH — ECONOMIC STABILITY: FOOD INSECURITY: WITHIN THE PAST 12 MONTHS, YOU WORRIED THAT YOUR FOOD WOULD RUN OUT BEFORE YOU GOT MONEY TO BUY MORE.: NEVER TRUE

## 2021-05-28 SDOH — ECONOMIC STABILITY: FOOD INSECURITY: WITHIN THE PAST 12 MONTHS, THE FOOD YOU BOUGHT JUST DIDN'T LAST AND YOU DIDN'T HAVE MONEY TO GET MORE.: NEVER TRUE

## 2021-05-28 ASSESSMENT — PATIENT HEALTH QUESTIONNAIRE - PHQ9
SUM OF ALL RESPONSES TO PHQ9 QUESTIONS 1 & 2: 1
SUM OF ALL RESPONSES TO PHQ QUESTIONS 1-9: 1

## 2021-05-28 ASSESSMENT — ENCOUNTER SYMPTOMS
BACK PAIN: 0
DIARRHEA: 0
NAUSEA: 0
BLOOD IN STOOL: 0
SHORTNESS OF BREATH: 0
CONSTIPATION: 0
ABDOMINAL PAIN: 0
SORE THROAT: 0
VOMITING: 0
COUGH: 0

## 2021-05-28 NOTE — PROGRESS NOTES
HPI Notes    Name: Ailyn Graff  : 1985         Chief Complaint:     Chief Complaint   Patient presents with    Employment Physical     Patient here today for [facility] physical.       History of Present Illness:        HPI  Pt is a 39 yo male who reports for employment physical. 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 Pt has been exercising recently and has lost.     Berna Ramirez has been working well to control asthma. Uses rescue inhaler \"every once in a while\". Continues to have problems with seasonal allergies. Does not take histamine blocker or use flonase, just uses singulair. Past Medical History:     Past Medical History:   Diagnosis Date    Asthma     Chickenpox     Osteoarthritis     Tuberculosis       Reviewed all health maintenance requirements and ordered appropriate tests  Health Maintenance Due   Topic Date Due    Hepatitis C screen  Never done    Varicella vaccine (1 of 2 - 2-dose childhood series) Never done    HIV screen  Never done       Past Surgical History:     Past Surgical History:   Procedure Laterality Date    HERNIA REPAIR      TONSILLECTOMY AND ADENOIDECTOMY          Medications:       Prior to Admission medications    Medication Sig Start Date End Date Taking?  Authorizing Provider   montelukast (SINGULAIR) 10 MG tablet Take 1 tablet by mouth nightly 21  Yes ELIZABETH Matute CNP   fluticasone-vilanterol (BREO ELLIPTA) 100-25 MCG/INH AEPB inhaler USE 1 INHALATION ORALLY    DAILY 21  Yes ELIZABETH Matute CNP   albuterol sulfate  (90 Base) MCG/ACT inhaler USE 2 INHALATIONS ORALLY   INTO THE LUNGS EVERY 6     HOURS AS NEEDED FOR        WHEEZING 20  Yes ELIZABETH Matute CNP   fluticasone (FLONASE) 50 MCG/ACT nasal spray 1 spray by Each Nostril route daily  Patient not taking: Reported on 2021 3/12/20   Olivia Course A Jump, DO        Allergies:       Patient has no known allergies. Social History:     Tobacco:    reports that he has never smoked. He has quit using smokeless tobacco.  His smokeless tobacco use included chew. Alcohol:      reports current alcohol use. Drug Use:  reports no history of drug use. Family History:        Family History   Adopted: Yes       Review of Systems:         Review of Systems   Constitutional: Negative for chills and fever. HENT: Negative for sore throat. Respiratory: Negative for cough and shortness of breath. Cardiovascular: Negative for chest pain, palpitations and leg swelling. Gastrointestinal: Negative for abdominal pain, blood in stool, constipation, diarrhea, nausea and vomiting. Genitourinary: Negative for dysuria, frequency and hematuria. Musculoskeletal: Negative for back pain and neck pain. Skin: Negative for rash. Neurological: Negative for dizziness, tremors, seizures, weakness and headaches. Hematological: Does not bruise/bleed easily. Psychiatric/Behavioral: Negative for suicidal ideas. The patient is not nervous/anxious. Physical Exam:     Vitals:  /84   Pulse 74   Temp 98 °F (36.7 °C) (Oral)   Ht 5' 10\" (1.778 m)   Wt 290 lb (131.5 kg)   SpO2 98%   BMI 41.61 kg/m²       Physical Exam  Vitals and nursing note reviewed. Constitutional:       Appearance: He is well-developed. HENT:      Head: Normocephalic. Right Ear: Hearing, tympanic membrane and external ear normal.      Left Ear: Tympanic membrane and external ear normal.      Nose: Nose normal.      Mouth/Throat:      Pharynx: Uvula midline. No oropharyngeal exudate. Eyes:      Conjunctiva/sclera: Conjunctivae normal.      Pupils: Pupils are equal, round, and reactive to light. Neck:      Vascular: No carotid bruit. Cardiovascular:      Rate and Rhythm: Normal rate and regular rhythm. Pulses:           Dorsalis pedis pulses are 2+ on the right side and 2+ on the left side.       Heart sounds: S1 normal and S2 normal.   Pulmonary:      Effort: Pulmonary effort is normal. No respiratory distress. Breath sounds: Normal breath sounds. Abdominal:      Palpations: Abdomen is soft. Tenderness: There is no abdominal tenderness. Musculoskeletal:         General: Normal range of motion. Cervical back: Normal range of motion and neck supple. Skin:     General: Skin is warm and dry. Findings: No rash. Neurological:      Mental Status: He is alert and oriented to person, place, and time. GCS: GCS eye subscore is 4. GCS verbal subscore is 5. GCS motor subscore is 6. Deep Tendon Reflexes: Reflexes are normal and symmetric. Psychiatric:         Speech: Speech normal.         Behavior: Behavior normal. Behavior is cooperative. Thought Content: Thought content normal.         Judgment: Judgment normal.               Data:     Lab Results   Component Value Date     06/07/2017    K 4.1 06/07/2017     06/07/2017    CO2 25 06/07/2017    BUN 11 06/07/2017    CREATININE 0.99 06/07/2017    GLUCOSE 101 06/07/2017    PROT 7.5 06/07/2017    LABALBU 4.5 06/07/2017    BILITOT 0.24 06/07/2017    ALKPHOS 62 06/07/2017    AST 37 06/07/2017    ALT 53 06/07/2017     Lab Results   Component Value Date    WBC 7.5 06/07/2017    RBC 5.27 06/07/2017    HGB 16.1 06/07/2017    HCT 48.0 06/07/2017    MCV 91.2 06/07/2017    MCH 30.6 06/07/2017    MCHC 33.6 06/07/2017    RDW 13.4 06/07/2017     06/07/2017    MPV NOT REPORTED 06/07/2017     No results found for: TSH  No results found for: CHOL, HDL, PSA, LABA1C       Assessment & Plan        Diagnosis Orders   1. Examination, physical, employee     2. Environmental allergies  montelukast (SINGULAIR) 10 MG tablet   3. Mild persistent asthma without complication       1. Routine adult health physical.  Patient has no complaints at this time. No abnormalities or deficiencies.      Patient educated about adequate water intake  Patient educated about exercise and fitness  Patient educated about nutrition    Health maintenance requirements reviewed with patient    If any further needs, return to office. 2. Pt will start histamine blocker. Start flonase. Continue singulair. 3. Doing well with breo and prn use of albuterol. Continue same. Patient verbalizes understanding and agreement with plan. All questions answered. If symptoms do not resolve or worsen, return to office. Completed Refills   Requested Prescriptions     Signed Prescriptions Disp Refills    montelukast (SINGULAIR) 10 MG tablet 90 tablet 1     Sig: Take 1 tablet by mouth nightly    fluticasone-vilanterol (BREO ELLIPTA) 100-25 MCG/INH AEPB inhaler 3 each 1     Sig: USE 1 INHALATION ORALLY    DAILY     No follow-ups on file. Orders Placed This Encounter   Medications    montelukast (SINGULAIR) 10 MG tablet     Sig: Take 1 tablet by mouth nightly     Dispense:  90 tablet     Refill:  1    fluticasone-vilanterol (BREO ELLIPTA) 100-25 MCG/INH AEPB inhaler     Sig: USE 1 INHALATION ORALLY    DAILY     Dispense:  3 each     Refill:  1     No orders of the defined types were placed in this encounter. There are no Patient Instructions on file for this visit. Electronically signed by ELIZABETH Baird CNP on 5/28/2021 at 4:08 PM            Completed Refills      Requested Prescriptions     Signed Prescriptions Disp Refills    montelukast (SINGULAIR) 10 MG tablet 90 tablet 1     Sig: Take 1 tablet by mouth nightly    fluticasone-vilanterol (BREO ELLIPTA) 100-25 MCG/INH AEPB inhaler 3 each 1     Sig: USE 1 INHALATION ORALLY    DAILY         Farzaneh Kiser received counseling on the following healthy behaviors: nutrition, exercise and medication adherence  Reviewed prior labs and health maintenance. Continue current medications, diet and exercise. Discussed use, benefit, and side effects of prescribed medications. Barriers to medication compliance addressed. Patient given educational materials - see patient instructions. All patient questions answered. Patient voiced understanding.

## 2022-07-07 RX ORDER — FLUTICASONE FUROATE AND VILANTEROL 100; 25 UG/1; UG/1
1 POWDER RESPIRATORY (INHALATION) DAILY
Qty: 90 EACH | Refills: 1 | Status: SHIPPED | OUTPATIENT
Start: 2022-07-07

## 2022-07-07 RX ORDER — ALBUTEROL SULFATE 90 UG/1
AEROSOL, METERED RESPIRATORY (INHALATION)
Qty: 54 G | Refills: 1 | Status: SHIPPED | OUTPATIENT
Start: 2022-07-07 | End: 2022-08-17 | Stop reason: SDUPTHER

## 2022-07-07 NOTE — TELEPHONE ENCOUNTER
Patient tested positive this morning for covid. He states he feels fine just a little congestion. Patient is asking for a refill on Breo Ellipta 100-25 MCG/Inhaler and Albuterol Sulfate inhaler. Patient does know that he is due for an appt. [facility]    Health Maintenance   Topic Date Due    Varicella vaccine (1 of 2 - 2-dose childhood series) Never done    HIV screen  Never done    Hepatitis C screen  Never done    Pneumococcal 0-64 years Vaccine (2 - PCV) 06/30/2018    COVID-19 Vaccine (3 - Booster for Pfizer series) 09/20/2021    Depression Screen  05/28/2022    Flu vaccine (1) 09/01/2022    DTaP/Tdap/Td vaccine (2 - Td or Tdap) 04/19/2030    Hepatitis A vaccine  Aged Out    Hepatitis B vaccine  Aged Out    Hib vaccine  Aged Out    Meningococcal (ACWY) vaccine  Aged Out             (applicable per patient's age: Cancer Screenings, Depression Screening, Fall Risk Screening, Immunizations)    AST (U/L)   Date Value   06/07/2017 37     ALT (U/L)   Date Value   06/07/2017 53 (H)     BUN (mg/dL)   Date Value   06/07/2017 11      (goal A1C is < 7)   (goal LDL is <100) need 30-50% reduction from baseline     BP Readings from Last 3 Encounters:   05/28/21 138/84   04/19/20 (!) 165/89   03/12/20 138/80    (goal /80)      All Future Testing planned in [facility]:      Next Visit Date:  No future appointments.          Patient Active Problem List:     Asthma     Environmental allergies

## 2022-07-07 NOTE — TELEPHONE ENCOUNTER
Last OV: 5/28/2021 Physical  Last RX:    Next scheduled apt: Visit date not found          Pt requesting a refill       Pt aware he need an appointment  He tested positive for COVID this AM

## 2022-08-17 RX ORDER — ALBUTEROL SULFATE 90 UG/1
AEROSOL, METERED RESPIRATORY (INHALATION)
Qty: 54 G | Refills: 1 | Status: SHIPPED | OUTPATIENT
Start: 2022-08-17

## 2022-11-29 RX ORDER — ALBUTEROL SULFATE 90 UG/1
AEROSOL, METERED RESPIRATORY (INHALATION)
Qty: 3 EACH | Refills: 1 | Status: SHIPPED | OUTPATIENT
Start: 2022-11-29

## 2022-11-29 NOTE — TELEPHONE ENCOUNTER
Last OV: 5/28/2021  PHYSICAL   Last RX:    Next scheduled apt: Visit date not found          Surescript requesting a refill

## 2022-11-29 NOTE — TELEPHONE ENCOUNTER
Albuterol inhaler    Mail order to [facility]    Last check up 5/21/21. Dean Marsh said he is 'healthy as a horse.'    Health Maintenance   Topic Date Due    Varicella vaccine (1 of 2 - 2-dose childhood series) Never done    HIV screen  Never done    Hepatitis C screen  Never done    COVID-19 Vaccine (3 - Booster for Pfizer series) 06/15/2021    Depression Screen  05/28/2022    Flu vaccine (1) Never done    DTaP/Tdap/Td vaccine (2 - Td or Tdap) 04/19/2030    Pneumococcal 0-64 years Vaccine  Completed    Hepatitis A vaccine  Aged Out    Hib vaccine  Aged Out    Meningococcal (ACWY) vaccine  Aged Out             (applicable per patient's age: Cancer Screenings, Depression Screening, Fall Risk Screening, Immunizations)    AST (U/L)   Date Value   06/07/2017 37     ALT (U/L)   Date Value   06/07/2017 53 (H)     BUN (mg/dL)   Date Value   06/07/2017 11      (goal A1C is < 7)   (goal LDL is <100) need 30-50% reduction from baseline     BP Readings from Last 3 Encounters:   05/28/21 138/84   04/19/20 (!) 165/89   03/12/20 138/80    (goal /80)      All Future Testing planned in [facility]:      Next Visit Date:  No future appointments.          Patient Active Problem List:     Asthma     Environmental allergies

## 2023-02-02 ENCOUNTER — OFFICE VISIT (OUTPATIENT)
Dept: FAMILY MEDICINE CLINIC | Age: 38
End: 2023-02-02
Payer: COMMERCIAL

## 2023-02-02 ENCOUNTER — HOSPITAL ENCOUNTER (OUTPATIENT)
Age: 38
Discharge: HOME | End: 2023-02-02
Payer: COMMERCIAL

## 2023-02-02 VITALS
BODY MASS INDEX: 41.23 KG/M2 | WEIGHT: 288 LBS | SYSTOLIC BLOOD PRESSURE: 132 MMHG | OXYGEN SATURATION: 96 % | HEIGHT: 70 IN | DIASTOLIC BLOOD PRESSURE: 88 MMHG | HEART RATE: 74 BPM

## 2023-02-02 DIAGNOSIS — R14.0 ABDOMINAL BLOATING: Primary | ICD-10-CM

## 2023-02-02 DIAGNOSIS — R14.0 ABDOMINAL BLOATING: ICD-10-CM

## 2023-02-02 DIAGNOSIS — R19.7 DIARRHEA, UNSPECIFIED TYPE: ICD-10-CM

## 2023-02-02 LAB
ABSOLUTE EOS #: 0.4 K/UL (ref 0–0.4)
ABSOLUTE LYMPH #: 1.9 K/UL (ref 1–4.8)
ABSOLUTE MONO #: 0.6 K/UL (ref 0–1)
ALBUMIN SERPL-MCNC: 4.5 G/DL (ref 3.5–5.2)
ALP SERPL-CCNC: 68 U/L (ref 40–129)
ALT SERPL-CCNC: 42 U/L (ref 5–41)
ANION GAP SERPL CALCULATED.3IONS-SCNC: 9 MMOL/L (ref 9–17)
AST SERPL-CCNC: 30 U/L
BASOPHILS # BLD: 1 % (ref 0–2)
BASOPHILS ABSOLUTE: 0 K/UL (ref 0–0.2)
BILIRUB SERPL-MCNC: 0.3 MG/DL (ref 0.3–1.2)
BUN SERPL-MCNC: 13 MG/DL (ref 6–20)
BUN/CREAT BLD: 13 (ref 9–20)
CALCIUM SERPL-MCNC: 10 MG/DL (ref 8.6–10.4)
CHLORIDE SERPL-SCNC: 102 MMOL/L (ref 98–107)
CO2 SERPL-SCNC: 27 MMOL/L (ref 20–31)
CREAT SERPL-MCNC: 1.04 MG/DL (ref 0.7–1.2)
EOSINOPHILS RELATIVE PERCENT: 5 % (ref 0–5)
GFR SERPL CREATININE-BSD FRML MDRD: >60 ML/MIN/1.73M2
GLUCOSE SERPL-MCNC: 96 MG/DL (ref 70–99)
HCT VFR BLD AUTO: 46.5 % (ref 41–53)
HGB BLD-MCNC: 16.2 G/DL (ref 13.5–17.5)
LYMPHOCYTES # BLD: 29 % (ref 13–44)
MCH RBC QN AUTO: 30.4 PG (ref 26–34)
MCHC RBC AUTO-ENTMCNC: 34.8 G/DL (ref 31–37)
MCV RBC AUTO: 87.3 FL (ref 80–100)
MONOCYTES # BLD: 9 % (ref 5–9)
MORPHOLOGY: NORMAL
MORPHOLOGY: NORMAL
PDW BLD-RTO: 13.1 % (ref 12.1–15.2)
PLATELET # BLD AUTO: 218 K/UL (ref 140–450)
POTASSIUM SERPL-SCNC: 4.4 MMOL/L (ref 3.7–5.3)
PROT SERPL-MCNC: 7.6 G/DL (ref 6.4–8.3)
RBC # BLD: 5.33 M/UL (ref 4.5–5.9)
SEG NEUTROPHILS: 56 % (ref 39–75)
SEGMENTED NEUTROPHILS ABSOLUTE COUNT: 3.8 K/UL (ref 2.1–6.5)
SODIUM SERPL-SCNC: 138 MMOL/L (ref 135–144)
WBC # BLD AUTO: 6.7 K/UL (ref 3.5–11)

## 2023-02-02 PROCEDURE — 36415 COLL VENOUS BLD VENIPUNCTURE: CPT

## 2023-02-02 PROCEDURE — 99213 OFFICE O/P EST LOW 20 MIN: CPT | Performed by: NURSE PRACTITIONER

## 2023-02-02 PROCEDURE — 85025 COMPLETE CBC W/AUTO DIFF WBC: CPT

## 2023-02-02 PROCEDURE — 82784 ASSAY IGA/IGD/IGG/IGM EACH: CPT

## 2023-02-02 PROCEDURE — 80053 COMPREHEN METABOLIC PANEL: CPT

## 2023-02-02 PROCEDURE — 83516 IMMUNOASSAY NONANTIBODY: CPT

## 2023-02-02 SDOH — ECONOMIC STABILITY: INCOME INSECURITY: HOW HARD IS IT FOR YOU TO PAY FOR THE VERY BASICS LIKE FOOD, HOUSING, MEDICAL CARE, AND HEATING?: NOT HARD AT ALL

## 2023-02-02 SDOH — ECONOMIC STABILITY: FOOD INSECURITY: WITHIN THE PAST 12 MONTHS, YOU WORRIED THAT YOUR FOOD WOULD RUN OUT BEFORE YOU GOT MONEY TO BUY MORE.: NEVER TRUE

## 2023-02-02 SDOH — ECONOMIC STABILITY: HOUSING INSECURITY
IN THE LAST 12 MONTHS, WAS THERE A TIME WHEN YOU DID NOT HAVE A STEADY PLACE TO SLEEP OR SLEPT IN A SHELTER (INCLUDING NOW)?: NO

## 2023-02-02 SDOH — ECONOMIC STABILITY: FOOD INSECURITY: WITHIN THE PAST 12 MONTHS, THE FOOD YOU BOUGHT JUST DIDN'T LAST AND YOU DIDN'T HAVE MONEY TO GET MORE.: NEVER TRUE

## 2023-02-02 ASSESSMENT — ENCOUNTER SYMPTOMS
NAUSEA: 0
BLOATING: 1
COUGH: 0
ABDOMINAL PAIN: 1
DIARRHEA: 1
VOMITING: 0
SHORTNESS OF BREATH: 0

## 2023-02-02 ASSESSMENT — PATIENT HEALTH QUESTIONNAIRE - PHQ9
SUM OF ALL RESPONSES TO PHQ QUESTIONS 1-9: 0
SUM OF ALL RESPONSES TO PHQ9 QUESTIONS 1 & 2: 0
2. FEELING DOWN, DEPRESSED OR HOPELESS: 0
1. LITTLE INTEREST OR PLEASURE IN DOING THINGS: 0
SUM OF ALL RESPONSES TO PHQ QUESTIONS 1-9: 0

## 2023-02-02 NOTE — PROGRESS NOTES
HPI Notes    Name: Liliana Lee  : 1985         Chief Complaint:     Chief Complaint   Patient presents with    GI Problem     X one month   Pt stated he has taken glutin out of diet for two weeks and stomach much better. Diarrhea x one month the the past two weeks pt having normal stool  and energy level much better        History of Present Illness:        GI Problem  The primary symptoms include abdominal pain and diarrhea. Primary symptoms do not include fever, nausea or vomiting. The illness began more than 7 days ago. The onset was sudden. The diarrhea is watery. The diarrhea occurs 2 to 4 times per day (pt suspects gluten intolerance). Risk factors for illness producing diarrhea include suspect food intake. The illness is also significant for bloating. The illness does not include chills. Past Medical History:     Past Medical History:   Diagnosis Date    Asthma     Chickenpox     Osteoarthritis     Tuberculosis       Reviewed all health maintenance requirements and ordered appropriate tests  Health Maintenance Due   Topic Date Due    Varicella vaccine (1 of 2 - 2-dose childhood series) Never done    HIV screen  Never done    Hepatitis C screen  Never done    Diabetes screen  Never done    COVID-19 Vaccine (4 - Booster for [employer] series) 2022    Flu vaccine (1) Never done       Past Surgical History:     Past Surgical History:   Procedure Laterality Date    HERNIA REPAIR      TONSILLECTOMY AND ADENOIDECTOMY          Medications:       Prior to Admission medications    Medication Sig Start Date End Date Taking?  Authorizing Provider   albuterol sulfate HFA (PROVENTIL;VENTOLIN;PROAIR) 108 (90 Base) MCG/ACT inhaler USE 2 INHALATIONS ORALLY   INTO THE LUNGS EVERY 6     HOURS AS NEEDED FOR        WHEEZING 22  Yes Miguel Riggs APRN - MAKI   fluticasone-vilanterol (BREO ELLIPTA) 100-25 MCG/INH AEPB inhaler Inhale 1 puff into the lungs daily 22  Yes Miguel Riggs APRN - CNP   montelukast (SINGULAIR) 10 MG tablet Take 1 tablet by mouth nightly  Patient not taking: Reported on 2/2/2023 5/28/21   ELIZABETH Lainez CNP   fluticasone (FLONASE) 50 MCG/ACT nasal spray 1 spray by Each Nostril route daily  Patient not taking: No sig reported 3/12/20   Mardella Apple Jump, DO        Allergies:       Patient has no known allergies. Social History:     Tobacco:    reports that he has never smoked. He has quit using smokeless tobacco.  His smokeless tobacco use included chew. Alcohol:      reports current alcohol use. Drug Use:  reports no history of drug use. Family History:     Family History   Adopted: Yes       Review of Systems:         Review of Systems   Constitutional:  Negative for chills and fever. Respiratory:  Negative for cough and shortness of breath. Cardiovascular:  Negative for chest pain and palpitations. Gastrointestinal:  Positive for abdominal pain, bloating and diarrhea. Negative for nausea and vomiting. Neurological:  Negative for dizziness, seizures and headaches. Physical Exam:     Vitals:  /88   Pulse 74   Ht 5' 10\" (1.778 m)   Wt 288 lb (130.6 kg)   SpO2 96%   BMI 41.32 kg/m²       Physical Exam  Vitals and nursing note reviewed. Constitutional:       Appearance: He is well-developed. Cardiovascular:      Rate and Rhythm: Normal rate and regular rhythm. Heart sounds: S1 normal and S2 normal.   Pulmonary:      Effort: Pulmonary effort is normal. No respiratory distress. Breath sounds: Normal breath sounds. Abdominal:      General: Bowel sounds are normal.      Palpations: Abdomen is soft. Tenderness: There is no abdominal tenderness. Skin:     General: Skin is warm and dry. Psychiatric:         Behavior: Behavior is cooperative.            Data:     Lab Results   Component Value Date/Time     06/07/2017 05:32 PM    K 4.1 06/07/2017 05:32 PM     06/07/2017 05:32 PM    CO2 25 06/07/2017 05:32 PM    BUN 11 06/07/2017 05:32 PM    CREATININE 0.99 06/07/2017 05:32 PM    GLUCOSE 101 06/07/2017 05:32 PM    PROT 7.5 06/07/2017 05:32 PM    LABALBU 4.5 06/07/2017 05:32 PM    BILITOT 0.24 06/07/2017 05:32 PM    ALKPHOS 62 06/07/2017 05:32 PM    AST 37 06/07/2017 05:32 PM    ALT 53 06/07/2017 05:32 PM     Lab Results   Component Value Date/Time    WBC 7.5 06/07/2017 05:32 PM    RBC 5.27 06/07/2017 05:32 PM    HGB 16.1 06/07/2017 05:32 PM    HCT 48.0 06/07/2017 05:32 PM    MCV 91.2 06/07/2017 05:32 PM    MCH 30.6 06/07/2017 05:32 PM    MCHC 33.6 06/07/2017 05:32 PM    RDW 13.4 06/07/2017 05:32 PM     06/07/2017 05:32 PM    MPV NOT REPORTED 06/07/2017 05:32 PM     No results found for: TSH  No results found for: CHOL, LDL, HDL, PSA, LABA1C       Assessment & Plan        Diagnosis Orders   1. Abdominal bloating  Celiac Disease Panel    CBC with Auto Differential    Comprehensive Metabolic Panel      2. Diarrhea, unspecified type  Celiac Disease Panel    CBC with Auto Differential    Comprehensive Metabolic Panel        Differential diagnosis is celiac disease versus IBS-D versus other. Continue gluten-free diet. Will send for lab testing. Patient verbalizes understanding and agreement with plan. All questions answered. If symptoms do not resolve or worsen, return to office. Completed Refills   Requested Prescriptions      No prescriptions requested or ordered in this encounter     No follow-ups on file. No orders of the defined types were placed in this encounter. Orders Placed This Encounter   Procedures    Celiac Disease Panel     Standing Status:   Future     Standing Expiration Date:   2/2/2024    CBC with Auto Differential     Standing Status:   Future     Standing Expiration Date:   3/8/2024    Comprehensive Metabolic Panel     Standing Status:   Future     Standing Expiration Date:   3/8/2024         There are no Patient Instructions on file for this visit.     Electronically signed by ELIZABETH French CNP on 2/2/2023 at 4:54 PM           Completed Refills   Requested Prescriptions      No prescriptions requested or ordered in this encounter

## 2023-02-04 LAB
GLIADIN IGA SER IA-ACNC: 1.2 U/ML
GLIADIN IGG SER IA-ACNC: <0.4 U/ML
IGA SERPL-MCNC: 188 MG/DL (ref 70–400)
TTG IGA SER IA-ACNC: 0.7 U/ML

## 2023-02-10 ENCOUNTER — OFFICE VISIT (OUTPATIENT)
Dept: FAMILY MEDICINE CLINIC | Age: 38
End: 2023-02-10
Payer: COMMERCIAL

## 2023-02-10 VITALS
HEART RATE: 76 BPM | DIASTOLIC BLOOD PRESSURE: 80 MMHG | TEMPERATURE: 97.6 F | OXYGEN SATURATION: 98 % | SYSTOLIC BLOOD PRESSURE: 136 MMHG

## 2023-02-10 DIAGNOSIS — K58.0 IRRITABLE BOWEL SYNDROME WITH DIARRHEA: ICD-10-CM

## 2023-02-10 DIAGNOSIS — J45.30 MILD PERSISTENT ASTHMA WITHOUT COMPLICATION: Primary | ICD-10-CM

## 2023-02-10 DIAGNOSIS — Z91.09 ENVIRONMENTAL ALLERGIES: ICD-10-CM

## 2023-02-10 PROCEDURE — 99214 OFFICE O/P EST MOD 30 MIN: CPT | Performed by: NURSE PRACTITIONER

## 2023-02-10 RX ORDER — FLUTICASONE FUROATE AND VILANTEROL 200; 25 UG/1; UG/1
1 POWDER RESPIRATORY (INHALATION) DAILY
Qty: 60 EACH | Refills: 5 | Status: SHIPPED | OUTPATIENT
Start: 2023-02-10

## 2023-02-10 RX ORDER — FLUTICASONE FUROATE AND VILANTEROL 100; 25 UG/1; UG/1
1 POWDER RESPIRATORY (INHALATION) DAILY
Qty: 3 EACH | Refills: 3 | Status: CANCELLED | OUTPATIENT
Start: 2023-02-10

## 2023-02-10 RX ORDER — MONTELUKAST SODIUM 10 MG/1
10 TABLET ORAL NIGHTLY
Qty: 90 TABLET | Refills: 1 | Status: SHIPPED | OUTPATIENT
Start: 2023-02-10

## 2023-02-10 ASSESSMENT — ENCOUNTER SYMPTOMS
VOMITING: 0
DIARRHEA: 1
NAUSEA: 0
SORE THROAT: 0
BACK PAIN: 0
BLOOD IN STOOL: 0
ABDOMINAL PAIN: 0
COUGH: 0
DIFFICULTY BREATHING: 0
CONSTIPATION: 0
BLOATING: 0
SHORTNESS OF BREATH: 0

## 2023-02-10 NOTE — PATIENT INSTRUCTIONS
SURVEY:    You may be receiving a survey from [employer] regarding your visit today. Please complete the survey to enable us to provide the highest quality of care to you and your family. If you cannot score us a very good (5 Stars) on any question, please call the office to discuss how we could have made your experience a very good one. Thank you.     Clinical Care Team: Fabiola Mendes, APRN-MAKI Pandya LPN    Clerical Team: Ann John

## 2023-02-10 NOTE — PROGRESS NOTES
HPI Notes    Name: Trent Coe  : 1985         Chief Complaint:     Chief Complaint   Patient presents with    Asthma     Patient here today for check up. Diarrhea     Patient here today for follow up. He said no diarrhea, feels much better. History of Present Illness:        Asthma  There is no cough, difficulty breathing or shortness of breath. This is a chronic problem. The current episode started more than 1 year ago. The problem occurs rarely. The problem has been gradually improving. Pertinent negatives include no chest pain, fever, headaches or sore throat. His symptoms are aggravated by nothing. His past medical history is significant for asthma. Diarrhea   This is a recurrent problem. The current episode started 1 to 4 weeks ago. The problem occurs 5 to 10 times per day. The problem has been gradually improving. The patient states that diarrhea does not awaken him from sleep. Pertinent negatives include no abdominal pain, bloating, chills, coughing, fever, headaches or vomiting. Nothing aggravates the symptoms. There are no known risk factors. He has tried change of diet for the symptoms. Celiac panel negative. Pt states that his symptoms are completely resolved. Past Medical History:     Past Medical History:   Diagnosis Date    Asthma     Chickenpox     Osteoarthritis     Tuberculosis       Reviewed all health maintenance requirements and ordered appropriate tests  Health Maintenance Due   Topic Date Due    Varicella vaccine (1 of 2 - 2-dose childhood series) Never done    HIV screen  Never done    Hepatitis C screen  Never done    COVID-19 Vaccine (4 - Booster for [place] series) 2022    Flu vaccine (1) Never done       Past Surgical History:     Past Surgical History:   Procedure Laterality Date    HERNIA REPAIR      TONSILLECTOMY AND ADENOIDECTOMY          Medications:       Prior to Admission medications    Medication Sig Start Date End Date Taking? Authorizing Provider   fluticasone furoate-vilanterol (BREO ELLIPTA) 200-25 MCG/ACT AEPB inhaler Inhale 1 puff into the lungs daily 2/10/23  Yes Gratiot Pacer, ELIZABETH - CNP   montelukast (SINGULAIR) 10 MG tablet Take 1 tablet by mouth nightly 2/10/23  Yes Michael Pacer, APRN - CNP   albuterol sulfate HFA (PROVENTIL;VENTOLIN;PROAIR) 108 (90 Base) MCG/ACT inhaler USE 2 INHALATIONS ORALLY   INTO THE LUNGS EVERY 6     HOURS AS NEEDED FOR        WHEEZING 11/29/22  Yes Gr[place] Pacer, APRN - CNP   fluticasone (FLONASE) 50 MCG/ACT nasal spray 1 spray by Each Nostril route daily 3/12/20  Yes Aries Shearer, DO        Allergies:       Patient has no known allergies. Social History:     Tobacco:    reports that he has never smoked. He has quit using smokeless tobacco.  His smokeless tobacco use included chew. Alcohol:      reports current alcohol use. Drug Use:  reports no history of drug use. Family History:     Family History   Adopted: Yes       Review of Systems:         Review of Systems   Constitutional:  Negative for chills and fever. HENT:  Negative for sore throat. Respiratory:  Negative for cough and shortness of breath. Cardiovascular:  Negative for chest pain, palpitations and leg swelling. Gastrointestinal:  Positive for diarrhea. Negative for abdominal pain, bloating, blood in stool, constipation, nausea and vomiting. Genitourinary:  Negative for dysuria, frequency and hematuria. Musculoskeletal:  Negative for back pain and neck pain. Skin:  Negative for rash. Neurological:  Negative for dizziness, tremors, seizures, weakness and headaches. Hematological:  Does not bruise/bleed easily. Psychiatric/Behavioral:  Negative for suicidal ideas. The patient is not nervous/anxious. Physical Exam:     Vitals:  /80   Pulse 76   Temp 97.6 °F (36.4 °C) (Oral)   SpO2 98%    L/min       Physical Exam  Vitals and nursing note reviewed.    Constitutional: Appearance: He is well-developed. HENT:      Head: Normocephalic. Right Ear: Hearing, tympanic membrane and external ear normal.      Left Ear: Tympanic membrane and external ear normal.      Nose: Nose normal.      Mouth/Throat:      Pharynx: Uvula midline. No oropharyngeal exudate. Eyes:      Conjunctiva/sclera: Conjunctivae normal.      Pupils: Pupils are equal, round, and reactive to light. Neck:      Vascular: No carotid bruit. Cardiovascular:      Rate and Rhythm: Normal rate and regular rhythm. Pulses:           Dorsalis pedis pulses are 2+ on the right side and 2+ on the left side. Heart sounds: S1 normal and S2 normal.   Pulmonary:      Effort: Pulmonary effort is normal. No respiratory distress. Breath sounds: Normal breath sounds. Abdominal:      Palpations: Abdomen is soft. Tenderness: There is no abdominal tenderness. Musculoskeletal:         General: Normal range of motion. Cervical back: Normal range of motion and neck supple. Skin:     General: Skin is warm and dry. Findings: No rash. Neurological:      Mental Status: He is alert and oriented to person, place, and time. GCS: GCS eye subscore is 4. GCS verbal subscore is 5. GCS motor subscore is 6. Deep Tendon Reflexes: Reflexes are normal and symmetric. Psychiatric:         Speech: Speech normal.         Behavior: Behavior normal. Behavior is cooperative. Thought Content:  Thought content normal.         Judgment: Judgment normal.             Data:     Lab Results   Component Value Date/Time     02/02/2023 04:54 PM    K 4.4 02/02/2023 04:54 PM     02/02/2023 04:54 PM    CO2 27 02/02/2023 04:54 PM    BUN 13 02/02/2023 04:54 PM    CREATININE 1.04 02/02/2023 04:54 PM    GLUCOSE 96 02/02/2023 04:54 PM    PROT 7.6 02/02/2023 04:54 PM    LABALBU 4.5 02/02/2023 04:54 PM    BILITOT 0.3 02/02/2023 04:54 PM    ALKPHOS 68 02/02/2023 04:54 PM    AST 30 02/02/2023 04:54 PM    ALT 42 02/02/2023 04:54 PM     Lab Results   Component Value Date/Time    WBC 6.7 02/02/2023 04:54 PM    RBC 5.33 02/02/2023 04:54 PM    HGB 16.2 02/02/2023 04:54 PM    HCT 46.5 02/02/2023 04:54 PM    MCV 87.3 02/02/2023 04:54 PM    MCH 30.4 02/02/2023 04:54 PM    MCHC 34.8 02/02/2023 04:54 PM    RDW 13.1 02/02/2023 04:54 PM     02/02/2023 04:54 PM    MPV NOT REPORTED 06/07/2017 05:32 PM     No results found for: TSH  No results found for: CHOL, LDL, HDL, PSA, LABA1C       Assessment & Plan        Diagnosis Orders   1. Mild persistent asthma without complication   -- Peak flow equals 450. This is slightly below 85% threshold. We will increase Breo to 200/25 mcg 1 puff daily. Patient will resume taking Singulair       2. Irritable bowel syndrome with diarrhea   --Symptoms resolved at this time. Continue to monitor       3. Environmental allergies   --Singulair. In the spring, could consider adding Claritin montelukast (SINGULAIR) 10 MG tablet        Patient verbalizes understanding and agreement with plan. All questions answered. If symptoms do not resolve or worsen, return to office. Completed Refills   Requested Prescriptions     Signed Prescriptions Disp Refills    fluticasone furoate-vilanterol (BREO ELLIPTA) 200-25 MCG/ACT AEPB inhaler 60 each 5     Sig: Inhale 1 puff into the lungs daily    montelukast (SINGULAIR) 10 MG tablet 90 tablet 1     Sig: Take 1 tablet by mouth nightly     No follow-ups on file. Orders Placed This Encounter   Medications    fluticasone furoate-vilanterol (BREO ELLIPTA) 200-25 MCG/ACT AEPB inhaler     Sig: Inhale 1 puff into the lungs daily     Dispense:  60 each     Refill:  5    montelukast (SINGULAIR) 10 MG tablet     Sig: Take 1 tablet by mouth nightly     Dispense:  90 tablet     Refill:  1     No orders of the defined types were placed in this encounter.         Patient Instructions   SURVEY:    You may be receiving a survey from [employer] regarding your visit today. Please complete the survey to enable us to provide the highest quality of care to you and your family. If you cannot score us a very good (5 Stars) on any question, please call the office to discuss how we could have made your experience a very good one. Thank you.     Clinical Care Team: Beverlyn Skiff, APRN-CNP Dedra Saa, LPN    Clerical Team: Michael 11   Electronically signed by Beverlyn Skiff, APRN - CNP on 2/10/2023 at 2:12 PM           Completed Refills   Requested Prescriptions     Signed Prescriptions Disp Refills    fluticasone furoate-vilanterol (BREO ELLIPTA) 200-25 MCG/ACT AEPB inhaler 60 each 5     Sig: Inhale 1 puff into the lungs daily    montelukast (SINGULAIR) 10 MG tablet 90 tablet 1     Sig: Take 1 tablet by mouth nightly

## 2023-03-08 ENCOUNTER — OFFICE VISIT (OUTPATIENT)
Dept: FAMILY MEDICINE CLINIC | Age: 38
End: 2023-03-08
Payer: COMMERCIAL

## 2023-03-08 VITALS
WEIGHT: 300 LBS | HEART RATE: 72 BPM | SYSTOLIC BLOOD PRESSURE: 126 MMHG | BODY MASS INDEX: 43.05 KG/M2 | DIASTOLIC BLOOD PRESSURE: 76 MMHG

## 2023-03-08 DIAGNOSIS — J01.20 ACUTE NON-RECURRENT ETHMOIDAL SINUSITIS: Primary | ICD-10-CM

## 2023-03-08 PROCEDURE — 99213 OFFICE O/P EST LOW 20 MIN: CPT | Performed by: FAMILY MEDICINE

## 2023-03-08 RX ORDER — DOXYCYCLINE HYCLATE 100 MG
100 TABLET ORAL 2 TIMES DAILY
Qty: 20 TABLET | Refills: 0 | Status: SHIPPED | OUTPATIENT
Start: 2023-03-08 | End: 2023-03-18

## 2023-03-08 RX ORDER — ALBUTEROL SULFATE 2.5 MG/3ML
2.5 SOLUTION RESPIRATORY (INHALATION) EVERY 4 HOURS PRN
Qty: 60 EACH | Refills: 1 | Status: SHIPPED | OUTPATIENT
Start: 2023-03-08

## 2023-03-08 RX ORDER — FLUTICASONE FUROATE AND VILANTEROL 200; 25 UG/1; UG/1
1 POWDER RESPIRATORY (INHALATION) DAILY
Qty: 3 EACH | Refills: 1 | Status: SHIPPED | OUTPATIENT
Start: 2023-03-08

## 2023-03-08 ASSESSMENT — ENCOUNTER SYMPTOMS
RHINORRHEA: 1
SINUS PAIN: 1
COUGH: 1
SORE THROAT: 0
VOMITING: 0
NAUSEA: 0

## 2023-03-08 NOTE — TELEPHONE ENCOUNTER
Last OV 2/1023 for Asthma     Requesting refill on breo, needs 3 month supply with his insurance.    Rx pending

## 2023-03-08 NOTE — PATIENT INSTRUCTIONS
Survey: You may be receiving a survey from [employer] regarding your visit today. You may get this in the mail, through your MyChart or in your email. Please complete the survey to enable us to provide the highest quality of care to you and your family. Please also, mention our names. If you cannot score us as very good (5 Stars) on any question, please feel free to call the office to discuss how we could have made your experience exceptional.      Thank You!         MD Saturnino Davis LPN

## 2023-03-08 NOTE — PROGRESS NOTES
HPI Notes    Name: Laika Barksdale  : 1985        Chief Complaint:     Chief Complaint   Patient presents with    URI     Sx's 1-2 weeks with head/chest congestion, cough. Denies sore throat or fever. Denies nausea or diarrhea. History of Present Illness:     Lakia Barksdale is a 42 y.o.  male who presents with URI (Sx's 1-2 weeks with head/chest congestion, cough. Denies sore throat or fever. Denies nausea or diarrhea. )      URI   This is a new problem. The current episode started 1 to 4 weeks ago (pt has been sick for about 2wks. ). There has been no fever. Associated symptoms include coughing, headaches, rhinorrhea and sinus pain. Pertinent negatives include no dysuria, ear pain, nausea, rash, sore throat or vomiting. Associated symptoms comments: Pt states he had had headaches the past week and pt states he usually never gets a headache. . Treatments tried: pt has been taking NyQuil and DayQuil. Past Medical History:     Past Medical History:   Diagnosis Date    Asthma     Chickenpox     Osteoarthritis     Tuberculosis       Reviewed all health maintenance requirements and ordered appropriate tests  Health Maintenance Due   Topic Date Due    Varicella vaccine (1 of 2 - 2-dose childhood series) Never done    HIV screen  Never done    Hepatitis C screen  Never done    COVID-19 Vaccine (4 - Booster for [place] series) 2022    Flu vaccine (1) Never done       Past Surgical History:     Past Surgical History:   Procedure Laterality Date    HERNIA REPAIR      TONSILLECTOMY AND ADENOIDECTOMY          Medications:       Prior to Admission medications    Medication Sig Start Date End Date Taking?  Authorizing Provider   fluticasone furoate-vilanterol (BREO ELLIPTA) 200-25 MCG/ACT AEPB inhaler Inhale 1 puff into the lungs daily 3/8/23  Yes ELIZABETH Garcia CNP   doxycycline hyclate (VIBRA-TABS) 100 MG tablet Take 1 tablet by mouth 2 times daily for 10 days 3/8/23 3/18/23 Yes Holli Loco MD   albuterol (PROVENTIL) (2.5 MG/3ML) 0.083% nebulizer solution Take 3 mLs by nebulization every 4 hours as needed for Wheezing 3/8/23  Yes Holli Loco MD   montelukast (SINGULAIR) 10 MG tablet Take 1 tablet by mouth nightly 2/10/23  Yes ELIZABETH Santana CNP   albuterol sulfate HFA (PROVENTIL;VENTOLIN;PROAIR) 108 (90 Base) MCG/ACT inhaler USE 2 INHALATIONS ORALLY   INTO THE LUNGS EVERY 6     HOURS AS NEEDED FOR        WHEEZING 11/29/22  Yes ELIZABETH Santana CNP   fluticasone (FLONASE) 50 MCG/ACT nasal spray 1 spray by Each Nostril route daily 3/12/20  Yes Aries Shearer DO        Allergies:       Patient has no known allergies. Social History:     Tobacco:    reports that he has never smoked. He has quit using smokeless tobacco.  His smokeless tobacco use included chew. Alcohol:      reports current alcohol use. Drug Use:  reports no history of drug use. Family History:     Family History   Adopted: Yes       Review of Systems:       Review of Systems   HENT:  Positive for rhinorrhea and sinus pain. Negative for ear pain and sore throat. Respiratory:  Positive for cough. Gastrointestinal:  Negative for nausea and vomiting. Genitourinary:  Negative for dysuria. Skin:  Negative for rash. Neurological:  Positive for headaches. Physical Exam:     Physical Exam  Vitals reviewed. Constitutional:       General: He is not in acute distress. Appearance: Normal appearance. He is not ill-appearing. HENT:      Head: Normocephalic and atraumatic. Right Ear: Tympanic membrane normal.      Left Ear: Tympanic membrane normal.      Nose: Congestion present. Right Turbinates: Enlarged. Left Turbinates: Enlarged. Comments: Tenderness over the ethmoid sinus area     Mouth/Throat:      Pharynx: No oropharyngeal exudate or posterior oropharyngeal erythema. Eyes:      General:         Right eye: No discharge. Left eye: No discharge. Conjunctiva/sclera: Conjunctivae normal.   Pulmonary:      Effort: Pulmonary effort is normal. No respiratory distress. Breath sounds: Normal breath sounds. Musculoskeletal:      Cervical back: Neck supple. No rigidity. Neurological:      Mental Status: He is alert. Vitals:  /76   Pulse 72   Wt 300 lb (136.1 kg)   BMI 43.05 kg/m²       Data:     Lab Results   Component Value Date/Time     02/02/2023 04:54 PM    K 4.4 02/02/2023 04:54 PM     02/02/2023 04:54 PM    CO2 27 02/02/2023 04:54 PM    BUN 13 02/02/2023 04:54 PM    CREATININE 1.04 02/02/2023 04:54 PM    GLUCOSE 96 02/02/2023 04:54 PM    PROT 7.6 02/02/2023 04:54 PM    LABALBU 4.5 02/02/2023 04:54 PM    BILITOT 0.3 02/02/2023 04:54 PM    ALKPHOS 68 02/02/2023 04:54 PM    AST 30 02/02/2023 04:54 PM    ALT 42 02/02/2023 04:54 PM     Lab Results   Component Value Date/Time    WBC 6.7 02/02/2023 04:54 PM    RBC 5.33 02/02/2023 04:54 PM    HGB 16.2 02/02/2023 04:54 PM    HCT 46.5 02/02/2023 04:54 PM    MCV 87.3 02/02/2023 04:54 PM    MCH 30.4 02/02/2023 04:54 PM    MCHC 34.8 02/02/2023 04:54 PM    RDW 13.1 02/02/2023 04:54 PM     02/02/2023 04:54 PM    MPV NOT REPORTED 06/07/2017 05:32 PM     No results found for: TSH  No results found for: CHOL, LDL, HDL, PSA, LABA1C       Assessment/Plan:      1. Acute non-recurrent ethmoidal sinusitis  Take all antibiotics, increase rest and fluids. F/U 4-5d if not better or sooner if worse. All questions answered.;      Return if symptoms worsen or fail to improve.       Electronically signed by Comfort Barger MD on 3/8/2023 at 3:15 PM

## 2023-03-15 NOTE — TELEPHONE ENCOUNTER
breo inhaler    Mail order to [facility] Maintenance   Topic Date Due    Varicella vaccine (1 of 2 - 2-dose childhood series) 12/20/1984    HIV screen  12/20/1998    Flu vaccine (Season Ended) 09/01/2020    DTaP/Tdap/Td vaccine (2 - Td) 04/19/2030    Pneumococcal 0-64 years Vaccine  Completed    Hepatitis A vaccine  Aged Out    Hepatitis B vaccine  Aged Out    Hib vaccine  Aged Out    Meningococcal (ACWY) vaccine  Aged Out             (applicable per patient's age: Cancer Screenings, Depression Screening, Fall Risk Screening, Immunizations)    AST (U/L)   Date Value   06/07/2017 37     ALT (U/L)   Date Value   06/07/2017 53 (H)     BUN (mg/dL)   Date Value   06/07/2017 11      (goal A1C is < 7)   (goal LDL is <100) need 30-50% reduction from baseline     BP Readings from Last 3 Encounters:   04/19/20 (!) 165/89   03/12/20 138/80   09/19/19 132/80    (goal /80)      All Future Testing planned in [facility]:      Next Visit Date:  No future appointments.          Patient Active Problem List:     Asthma     Mild intermittent asthma without complication
no chills/no decreased eating/drinking/no dizziness/no fever/no nausea/no pain/no tingling/no vomiting/no weakness

## 2023-07-06 ENCOUNTER — OFFICE VISIT (OUTPATIENT)
Dept: FAMILY MEDICINE CLINIC | Age: 38
End: 2023-07-06
Payer: COMMERCIAL

## 2023-07-06 VITALS
SYSTOLIC BLOOD PRESSURE: 130 MMHG | DIASTOLIC BLOOD PRESSURE: 82 MMHG | HEART RATE: 72 BPM | WEIGHT: 298.5 LBS | BODY MASS INDEX: 42.73 KG/M2 | HEIGHT: 70 IN | OXYGEN SATURATION: 95 %

## 2023-07-06 DIAGNOSIS — R06.83 LOUD SNORING: ICD-10-CM

## 2023-07-06 DIAGNOSIS — R53.83 FATIGUE, UNSPECIFIED TYPE: Primary | ICD-10-CM

## 2023-07-06 DIAGNOSIS — Z91.09 ENVIRONMENTAL ALLERGIES: ICD-10-CM

## 2023-07-06 DIAGNOSIS — Z30.09 VASECTOMY EVALUATION: ICD-10-CM

## 2023-07-06 DIAGNOSIS — R40.0 DAYTIME SOMNOLENCE: ICD-10-CM

## 2023-07-06 DIAGNOSIS — J45.30 MILD PERSISTENT ASTHMA WITHOUT COMPLICATION: ICD-10-CM

## 2023-07-06 PROCEDURE — 99214 OFFICE O/P EST MOD 30 MIN: CPT | Performed by: NURSE PRACTITIONER

## 2023-07-06 RX ORDER — FLUTICASONE FUROATE, UMECLIDINIUM BROMIDE AND VILANTEROL TRIFENATATE 200; 62.5; 25 UG/1; UG/1; UG/1
1 POWDER RESPIRATORY (INHALATION) DAILY
Qty: 1 EACH | Refills: 2 | Status: SHIPPED | OUTPATIENT
Start: 2023-07-06

## 2023-07-06 RX ORDER — ALBUTEROL SULFATE 90 UG/1
AEROSOL, METERED RESPIRATORY (INHALATION)
Qty: 3 EACH | Refills: 1 | Status: SHIPPED | OUTPATIENT
Start: 2023-07-06

## 2023-07-06 RX ORDER — FLUTICASONE FUROATE AND VILANTEROL 200; 25 UG/1; UG/1
1 POWDER RESPIRATORY (INHALATION) DAILY
Qty: 3 EACH | Refills: 1 | Status: CANCELLED | OUTPATIENT
Start: 2023-07-06

## 2023-07-06 ASSESSMENT — ENCOUNTER SYMPTOMS
COUGH: 0
CONSTIPATION: 0
SORE THROAT: 0
BACK PAIN: 0
CHEST TIGHTNESS: 1
VOMITING: 0
DIARRHEA: 0
ABDOMINAL PAIN: 0
NAUSEA: 0
BLOOD IN STOOL: 0
SHORTNESS OF BREATH: 1

## 2023-07-06 NOTE — PROGRESS NOTES
HPI Notes    Name: Oda Shone  : 1985         Chief Complaint:     Chief Complaint   Patient presents with    Snoring     Would like to discuss sleep study, snoring, tired and loosing focus       History of Present Illness:        HPI  Pt is a 41 yo male who reports for daytime somnolence, fatigue, and loud snoring. First noticed about 3 years ago. Pt has gained weight recently. Pt has asthma. Has been taking breo and albuterol. Symptoms have been moderately controlled. Having some symptoms of chest tightness and SOB. Pt wanting to get a referral for vasectomy. Past Medical History:     Past Medical History:   Diagnosis Date    Asthma     Chickenpox     Osteoarthritis     Tuberculosis       Reviewed all health maintenance requirements and ordered appropriate tests  Health Maintenance Due   Topic Date Due    Varicella vaccine (1 of 2 - 2-dose childhood series) Never done    HIV screen  Never done    Hepatitis C screen  Never done    COVID-19 Vaccine (4 - Booster for [place] series) 2022       Past Surgical History:     Past Surgical History:   Procedure Laterality Date    HERNIA REPAIR      TONSILLECTOMY AND ADENOIDECTOMY          Medications:       Prior to Admission medications    Medication Sig Start Date End Date Taking?  Authorizing Provider   albuterol sulfate HFA (PROVENTIL;VENTOLIN;PROAIR) 108 (90 Base) MCG/ACT inhaler USE 2 INHALATIONS ORALLY   INTO THE LUNGS EVERY 6     HOURS AS NEEDED FOR        WHEEZING 23  Yes ELIZABETH Estrada CNP   fluticasone-umeclidin-vilant (TRELEGY ELLIPTA) 200-62.5-25 MCG/ACT AEPB inhaler Inhale 1 puff into the lungs daily 23  Yes ELIZABETH Estrada CNP   albuterol (PROVENTIL) (2.5 MG/3ML) 0.083% nebulizer solution Take 3 mLs by nebulization every 4 hours as needed for Wheezing 3/8/23  Yes Ashu Storm MD   montelukast (SINGULAIR) 10 MG tablet Take 1 tablet by mouth nightly 2/10/23  Yes ELIZABETH Estrada CNP

## 2023-07-21 ENCOUNTER — TELEPHONE (OUTPATIENT)
Dept: FAMILY MEDICINE CLINIC | Age: 38
End: 2023-07-21

## 2023-07-21 NOTE — TELEPHONE ENCOUNTER
----- Message from Gabrielclare Sanford sent at 7/21/2023  2:03 PM EDT -----  Subject: Message to Provider    QUESTIONS  Information for Provider? Pt calling about his sleep study, no one has   called him about when it is to be, it has been a few weeks ago that it was   to be done. Please call when things have been set up.  ---------------------------------------------------------------------------  --------------  Leandro Cadlwell INFO  [phone]; OK to leave message on voicemail  ---------------------------------------------------------------------------  --------------  SCRIPT ANSWERS  Relationship to Patient?  Self

## 2023-09-21 ENCOUNTER — HOSPITAL ENCOUNTER (OUTPATIENT)
Dept: SLEEP CENTER | Age: 38
Discharge: HOME | End: 2023-09-21
Payer: COMMERCIAL

## 2023-09-21 VITALS — HEIGHT: 70 IN | WEIGHT: 298 LBS | BODY MASS INDEX: 42.66 KG/M2

## 2023-09-21 PROCEDURE — 95811 POLYSOM 6/>YRS CPAP 4/> PARM: CPT

## 2023-09-21 ASSESSMENT — SLEEP AND FATIGUE QUESTIONNAIRES
HAS ANYONE NOTICED THAT YOU QUIT BREATHING DURING SLEEP: ALMOST DAILY
SNORING VOLUME: LOUDER THAN TALKING
HOW LIKELY ARE YOU TO NOD OFF OR FALL ASLEEP WHILE SITTING AND READING: 3
ARE YOU TIRED DURING WAKE TIME: ALMOST DAILY
HOW LIKELY ARE YOU TO NOD OFF OR FALL ASLEEP WHILE SITTING AND TALKING TO SOMEONE: 0
DO YOU SNORE: YES
DO YOU HAVE HIGH BLOOD PRESSURE: NO
I SLEEP WELL: NO
HOW LIKELY ARE YOU TO NOD OFF OR FALL ASLEEP WHEN YOU ARE A PASSENGER IN A CAR FOR AN HOUR WITHOUT A BREAK: 3
HAVE YOU EVER NODDED OFF OR FALLEN ASLEEP WHILE DRIVING: NO
WHAT TIME DO YOU USUALLY WAKE UP: 12600
HOW LIKELY ARE YOU TO NOD OFF OR FALL ASLEEP WHILE LYING DOWN TO REST IN THE AFTERNOON WHEN CIRCUMSTANCES PERMIT: 3
DOES YOUR SNORING BOTHER OTHERS: YES
FOR THE FIRST 30 MINUTES AFTER WAKING, I AM: SOMEWHAT DROWSY
ARE YOU TIRED AFTER SLEEPING: ALMOST DAILY
NORMAL AMOUNT OF SLEEP PER NIGHT: 6
HOW MANY NAPS DO YOU TAKE PER WEEK: 5
HOW LIKELY ARE YOU TO NOD OFF OR FALL ASLEEP IN A CAR, WHILE STOPPED FOR A FEW MINUTES IN TRAFFIC: 0
USUAL AMOUNT OF TIME TO FALL ASLEEP (MIN): 5
FUNCTION BEST IN: MORNING
NUMBER OF TIMES YOU WAKE PER NIGHT: 1
HOW OFTEN DO YOU SNORE: ALMOST DAILY
HOW LIKELY ARE YOU TO NOD OFF OR FALL ASLEEP WHILE SITTING QUIETLY AFTER LUNCH WITHOUT ALCOHOL: 3
HOW LIKELY ARE YOU TO NOD OFF OR FALL ASLEEP WHILE SITTING INACTIVE IN A PUBLIC PLACE: 3
ESS TOTAL SCORE: 18
HOW LIKELY ARE YOU TO NOD OFF OR FALL ASLEEP WHILE WATCHING TV: 3
WHAT TIME DO YOU USUALLY GO TO BED: 75600

## 2023-09-22 NOTE — PROGRESS NOTES
Patient arrived for diagnostic sleep study 9/21/23 at 8:00pm  Patient qualified to split due to severe apnea  [facility] for VIRA Montanez full face size S/M

## 2023-09-29 LAB — STATUS: NORMAL

## 2023-10-03 ENCOUNTER — TELEPHONE (OUTPATIENT)
Dept: FAMILY MEDICINE CLINIC | Age: 38
End: 2023-10-03

## 2023-10-03 NOTE — TELEPHONE ENCOUNTER
Chadwick Justiceyonas said he had spoken to [facility] regarding his sleep study. He did say that someone would be reaching out to him regarding cpap machine. He has not heard anything yet and would like to know who he needs to contact or have someone contact him. Please let Chadwick Michael know. Health Maintenance   Topic Date Due    Hepatitis B vaccine (1 of 3 - 3-dose series) Never done    Varicella vaccine (1 of 2 - 2-dose childhood series) Never done    HIV screen  Never done    Hepatitis C screen  Never done    Pneumococcal 0-64 years Vaccine (2 - PCV) 06/30/2018    COVID-19 Vaccine (4 - Pfizer series) 01/17/2022    Flu vaccine (1) Never done    Depression Screen  02/02/2024    DTaP/Tdap/Td vaccine (2 - Td or Tdap) 04/19/2030    Hepatitis A vaccine  Aged Out    Hib vaccine  Aged Out    HPV vaccine  Aged Out    Meningococcal (ACWY) vaccine  Aged Out             (applicable per patient's age: Cancer Screenings, Depression Screening, Fall Risk Screening, Immunizations)    AST (U/L)   Date Value   02/02/2023 30     ALT (U/L)   Date Value   02/02/2023 42 (H)     BUN (mg/dL)   Date Value   02/02/2023 13      (goal A1C is < 7)   (goal LDL is <100) need 30-50% reduction from baseline     BP Readings from Last 3 Encounters:   07/06/23 130/82   03/08/23 126/76   02/10/23 136/80    (goal /80)      All Future Testing planned in [facility]:  Lab Frequency Next Occurrence       Next Visit Date:  No future appointments.          Patient Active Problem List:     Asthma     Environmental allergies

## 2023-10-18 NOTE — TELEPHONE ENCOUNTER
Last OV 7/6/23 for fatigue, snoring, asthma  Requesting refill on trelegy to [facility]  Rx pending

## 2023-10-19 RX ORDER — FLUTICASONE FUROATE, UMECLIDINIUM BROMIDE AND VILANTEROL TRIFENATATE 200; 62.5; 25 UG/1; UG/1; UG/1
1 POWDER RESPIRATORY (INHALATION) DAILY
Qty: 3 EACH | Refills: 2 | Status: SHIPPED | OUTPATIENT
Start: 2023-10-19

## 2023-10-26 RX ORDER — FLUTICASONE FUROATE, UMECLIDINIUM BROMIDE AND VILANTEROL TRIFENATATE 200; 62.5; 25 UG/1; UG/1; UG/1
1 POWDER RESPIRATORY (INHALATION) DAILY
Qty: 3 EACH | Refills: 2 | Status: SHIPPED | OUTPATIENT
Start: 2023-10-26

## 2023-10-26 RX ORDER — ALBUTEROL SULFATE 90 UG/1
AEROSOL, METERED RESPIRATORY (INHALATION)
Qty: 3 EACH | Refills: 1 | Status: SHIPPED | OUTPATIENT
Start: 2023-10-26

## 2023-10-30 NOTE — TELEPHONE ENCOUNTER
Last OV: 5/28/2021    Next scheduled apt: Visit date not found      Patient requesting refill and states that he didn't receive that last refill sent.    May need prior auth
forehead

## 2023-12-27 NOTE — TELEPHONE ENCOUNTER
Date seen in ED/UC: 12/27    For problem of: Patellar bursitis of right knee (Diagnosis from referral)     Please triage and route back for scheduling. (P OSW ORTHO SCHED RECEP [facility] [phone])    ATTENTION:   IF PATIENT CALLS PRIOR TO IN DEPARTMENT CLINICAL TRIAGE COMPLETION, PLEASE LET THE PATIENT KNOW THEIR CONCERN IS BEING HANDLED AND WE WILL BE CALLING THEM BACK IN ATIMELY MANNER.     Patient is asking for a refill on Breo Ellipta 100-25 MCG inhaler and Proair HFA.      [address] Maintenance   Topic Date Due    HIV screen  12/20/1998    DTaP/Tdap/Td vaccine (1 - Tdap) 12/20/2002    Flu vaccine (Season Ended) 03/14/2020 (Originally 9/1/2019)    Pneumococcal 0-64 years Vaccine  Completed             (applicable per patient's age: Cancer Screenings, Depression Screening, Fall Risk Screening, Immunizations)    AST (U/L)   Date Value   06/07/2017 37     ALT (U/L)   Date Value   06/07/2017 53 (H)     BUN (mg/dL)   Date Value   06/07/2017 11      (goal A1C is < 7)   (goal LDL is <100) need 30-50% reduction from baseline     BP Readings from Last 3 Encounters:   03/14/19 138/86   09/25/18 138/82   01/26/18 124/88    (goal /80)      All Future Testing planned in [facility]:      Next Visit Date:  Future Appointments   Date Time Provider Joselyn Alejandre   9/19/2019  3:40 PM Robert Mooney APRN - [facility]            Patient Active Problem List:     Asthma     Mild intermittent asthma without complication

## 2024-02-09 ENCOUNTER — TELEPHONE (OUTPATIENT)
Dept: FAMILY MEDICINE CLINIC | Age: 39
End: 2024-02-09

## 2024-02-16 ENCOUNTER — OFFICE VISIT (OUTPATIENT)
Dept: FAMILY MEDICINE CLINIC | Age: 39
End: 2024-02-16
Payer: COMMERCIAL

## 2024-02-16 VITALS
SYSTOLIC BLOOD PRESSURE: 138 MMHG | OXYGEN SATURATION: 96 % | HEART RATE: 86 BPM | BODY MASS INDEX: 42.99 KG/M2 | WEIGHT: 307.1 LBS | DIASTOLIC BLOOD PRESSURE: 84 MMHG | HEIGHT: 71 IN

## 2024-02-16 DIAGNOSIS — F90.9 ADULT ADHD: Primary | ICD-10-CM

## 2024-02-16 PROCEDURE — 99213 OFFICE O/P EST LOW 20 MIN: CPT | Performed by: NURSE PRACTITIONER

## 2024-02-16 RX ORDER — ATOMOXETINE 40 MG/1
40 CAPSULE ORAL DAILY
Qty: 30 CAPSULE | Refills: 0 | Status: SHIPPED | OUTPATIENT
Start: 2024-02-16

## 2024-02-16 RX ORDER — FLUTICASONE FUROATE AND VILANTEROL 100; 25 UG/1; UG/1
1 POWDER RESPIRATORY (INHALATION) DAILY
COMMUNITY

## 2024-02-16 NOTE — PROGRESS NOTES
BUN 13 02/02/2023 04:54 PM    CREATININE 1.04 02/02/2023 04:54 PM    GLUCOSE 96 02/02/2023 04:54 PM    PROT 7.6 02/02/2023 04:54 PM    LABALBU 4.5 02/02/2023 04:54 PM    BILITOT 0.3 02/02/2023 04:54 PM    ALKPHOS 68 02/02/2023 04:54 PM    AST 30 02/02/2023 04:54 PM    ALT 42 02/02/2023 04:54 PM     Lab Results   Component Value Date/Time    WBC 6.7 02/02/2023 04:54 PM    RBC 5.33 02/02/2023 04:54 PM    HGB 16.2 02/02/2023 04:54 PM    HCT 46.5 02/02/2023 04:54 PM    MCV 87.3 02/02/2023 04:54 PM    MCH 30.4 02/02/2023 04:54 PM    MCHC 34.8 02/02/2023 04:54 PM    RDW 13.1 02/02/2023 04:54 PM     02/02/2023 04:54 PM    MPV NOT REPORTED 06/07/2017 05:32 PM     No results found for: \"TSH\"  No results found for: \"CHOL\", \"LDL\", \"HDL\", \"PSA\", \"LABA1C\"       Assessment & Plan        Diagnosis Orders   1. Adult ADHD          Will start on non-stimulant strattera 40mg. Pt educated about medication.     Patient verbalizes understanding and agreement with plan.  All questions answered. If symptoms do not resolve or worsen, return to office.                 Completed Refills   Requested Prescriptions     Signed Prescriptions Disp Refills    atomoxetine (STRATTERA) 40 MG capsule 30 capsule 0     Sig: Take 1 capsule by mouth daily     No follow-ups on file.  Orders Placed This Encounter   Medications    atomoxetine (STRATTERA) 40 MG capsule     Sig: Take 1 capsule by mouth daily     Dispense:  30 capsule     Refill:  0     No orders of the defined types were placed in this encounter.        Patient Instructions     SURVEY:    You may be receiving a survey from [employer] regarding your visit today.    Please complete the survey to enable us to provide the highest quality of care to you and your family.    If you cannot score us a very good on any question, please call the office to discuss how we could have made your experience a very good one.    Thank you.     Electronically signed by Marlon Costa DNP,APRN,CNP  on

## 2024-03-26 ENCOUNTER — OFFICE VISIT (OUTPATIENT)
Dept: FAMILY MEDICINE CLINIC | Age: 39
End: 2024-03-26
Payer: COMMERCIAL

## 2024-03-26 VITALS
DIASTOLIC BLOOD PRESSURE: 88 MMHG | WEIGHT: 309 LBS | OXYGEN SATURATION: 95 % | BODY MASS INDEX: 43.26 KG/M2 | HEART RATE: 85 BPM | HEIGHT: 71 IN | SYSTOLIC BLOOD PRESSURE: 138 MMHG

## 2024-03-26 DIAGNOSIS — F90.9 ADULT ADHD: Primary | ICD-10-CM

## 2024-03-26 PROCEDURE — 99213 OFFICE O/P EST LOW 20 MIN: CPT | Performed by: NURSE PRACTITIONER

## 2024-03-26 RX ORDER — DEXTROAMPHETAMINE SACCHARATE, AMPHETAMINE ASPARTATE MONOHYDRATE, DEXTROAMPHETAMINE SULFATE AND AMPHETAMINE SULFATE 2.5; 2.5; 2.5; 2.5 MG/1; MG/1; MG/1; MG/1
10 CAPSULE, EXTENDED RELEASE ORAL DAILY
Qty: 30 CAPSULE | Refills: 0 | Status: SHIPPED | OUTPATIENT
Start: 2024-03-26 | End: 2024-04-25

## 2024-03-26 ASSESSMENT — ENCOUNTER SYMPTOMS
VOMITING: 0
SHORTNESS OF BREATH: 0
NAUSEA: 0
COUGH: 0
DIARRHEA: 0

## 2024-03-26 NOTE — PROGRESS NOTES
HPI Notes    Name: Alonzo Marinelli  : 1985         Chief Complaint:     Chief Complaint   Patient presents with    ADHD     Patient doesn't notice any improvement since starting the Strettera 40mg. Patient states his mind is still \"squirrling\"       History of Present Illness:        ADHD  This is a recurrent problem. The current episode started more than 1 month ago. The problem occurs constantly. The problem has been waxing and waning. Pertinent negatives include no chest pain, chills, coughing, fever, headaches, nausea or vomiting. Treatments tried: strattera. The treatment provided no relief.       Past Medical History:     Past Medical History:   Diagnosis Date    Asthma     Chickenpox     Osteoarthritis     Tuberculosis       Reviewed all health maintenance requirements and ordered appropriate tests  Health Maintenance Due   Topic Date Due    Hepatitis B vaccine (1 of 3 - 3-dose series) Never done    Varicella vaccine (1 of 2 - 2-dose childhood series) Never done    HIV screen  Never done    Hepatitis C screen  Never done    Pneumococcal 0-64 years Vaccine (2 of 2 - PCV) 2018    Flu vaccine (1) Never done    COVID-19 Vaccine (2023- season) 2023    Lipids  Never done       Past Surgical History:     Past Surgical History:   Procedure Laterality Date    HERNIA REPAIR      TONSILLECTOMY AND ADENOIDECTOMY          Medications:       Prior to Admission medications    Medication Sig Start Date End Date Taking? Authorizing Provider   amphetamine-dextroamphetamine (ADDERALL XR) 10 MG extended release capsule Take 1 capsule by mouth daily for 30 days. Max Daily Amount: 10 mg 3/26/24 4/25/24 Yes Marlon Costa DNP   fluticasone furoate-vilanterol (BREO ELLIPTA) 100-25 MCG/ACT inhaler Inhale 1 puff into the lungs daily   Yes Provider, MD Roly   atomoxetine (STRATTERA) 40 MG capsule Take 1 capsule by mouth daily 24  Yes Marlon Costa DNP   albuterol sulfate HFA

## 2024-04-30 ENCOUNTER — OFFICE VISIT (OUTPATIENT)
Dept: FAMILY MEDICINE CLINIC | Age: 39
End: 2024-04-30
Payer: COMMERCIAL

## 2024-04-30 VITALS
WEIGHT: 296 LBS | HEART RATE: 67 BPM | DIASTOLIC BLOOD PRESSURE: 98 MMHG | BODY MASS INDEX: 41.44 KG/M2 | OXYGEN SATURATION: 97 % | HEIGHT: 71 IN | SYSTOLIC BLOOD PRESSURE: 144 MMHG

## 2024-04-30 DIAGNOSIS — F90.9 ADULT ADHD: ICD-10-CM

## 2024-04-30 DIAGNOSIS — J45.30 MILD PERSISTENT ASTHMA WITHOUT COMPLICATION: Primary | ICD-10-CM

## 2024-04-30 PROCEDURE — 99214 OFFICE O/P EST MOD 30 MIN: CPT | Performed by: NURSE PRACTITIONER

## 2024-04-30 RX ORDER — DEXTROAMPHETAMINE SACCHARATE, AMPHETAMINE ASPARTATE MONOHYDRATE, DEXTROAMPHETAMINE SULFATE AND AMPHETAMINE SULFATE 5; 5; 5; 5 MG/1; MG/1; MG/1; MG/1
20 CAPSULE, EXTENDED RELEASE ORAL EVERY MORNING
Qty: 30 CAPSULE | Refills: 0 | Status: SHIPPED | OUTPATIENT
Start: 2024-04-30 | End: 2024-05-30

## 2024-04-30 RX ORDER — LORATADINE 10 MG/1
10 TABLET ORAL DAILY
COMMUNITY

## 2024-04-30 RX ORDER — FLUTICASONE FUROATE AND VILANTEROL 100; 25 UG/1; UG/1
1 POWDER RESPIRATORY (INHALATION) DAILY
Qty: 3 EACH | Refills: 1 | Status: SHIPPED | OUTPATIENT
Start: 2024-04-30

## 2024-04-30 RX ORDER — ALBUTEROL SULFATE 90 UG/1
AEROSOL, METERED RESPIRATORY (INHALATION)
Qty: 3 EACH | Refills: 1 | Status: SHIPPED | OUTPATIENT
Start: 2024-04-30

## 2024-04-30 ASSESSMENT — ENCOUNTER SYMPTOMS
NAUSEA: 0
COUGH: 0
SHORTNESS OF BREATH: 0
DIARRHEA: 0
VOMITING: 0

## 2024-04-30 NOTE — PROGRESS NOTES
capsule 0     Sig: Take 1 capsule by mouth every morning for 30 days. Max Daily Amount: 20 mg     Return in about 4 weeks (around 5/28/2024) for ADHD.  Orders Placed This Encounter   Medications    albuterol sulfate HFA (PROVENTIL;VENTOLIN;PROAIR) 108 (90 Base) MCG/ACT inhaler     Sig: USE 2 INHALATIONS ORALLY   INTO THE LUNGS EVERY 6     HOURS AS NEEDED FOR        WHEEZING     Dispense:  3 each     Refill:  1    fluticasone furoate-vilanterol (BREO ELLIPTA) 100-25 MCG/ACT inhaler     Sig: Inhale 1 puff into the lungs daily     Dispense:  3 each     Refill:  1    amphetamine-dextroamphetamine (ADDERALL XR) 20 MG extended release capsule     Sig: Take 1 capsule by mouth every morning for 30 days. Max Daily Amount: 20 mg     Dispense:  30 capsule     Refill:  0     No orders of the defined types were placed in this encounter.        Patient Instructions     SURVEY:    You may be receiving a survey from [employer] regarding your visit today.    Please complete the survey to enable us to provide the highest quality of care to you and your family.    If you cannot score us a very good on any question, please call the office to discuss how we could have made your experience a very good one.    Thank you.     Electronically signed by Marlon Costa DNP,APRN,CNP  on 4/30/2024 at 6:15 PM           Completed Refills   Requested Prescriptions     Signed Prescriptions Disp Refills    albuterol sulfate HFA (PROVENTIL;VENTOLIN;PROAIR) 108 (90 Base) MCG/ACT inhaler 3 each 1     Sig: USE 2 INHALATIONS ORALLY   INTO THE LUNGS EVERY 6     HOURS AS NEEDED FOR        WHEEZING    fluticasone furoate-vilanterol (BREO ELLIPTA) 100-25 MCG/ACT inhaler 3 each 1     Sig: Inhale 1 puff into the lungs daily    amphetamine-dextroamphetamine (ADDERALL XR) 20 MG extended release capsule 30 capsule 0     Sig: Take 1 capsule by mouth every morning for 30 days. Max Daily Amount: 20 mg

## 2024-05-31 ENCOUNTER — OFFICE VISIT (OUTPATIENT)
Dept: FAMILY MEDICINE CLINIC | Age: 39
End: 2024-05-31
Payer: COMMERCIAL

## 2024-05-31 VITALS — HEART RATE: 85 BPM | OXYGEN SATURATION: 96 % | SYSTOLIC BLOOD PRESSURE: 158 MMHG | DIASTOLIC BLOOD PRESSURE: 108 MMHG

## 2024-05-31 DIAGNOSIS — F90.9 ADULT ADHD: Primary | ICD-10-CM

## 2024-05-31 PROCEDURE — 99213 OFFICE O/P EST LOW 20 MIN: CPT | Performed by: NURSE PRACTITIONER

## 2024-05-31 RX ORDER — DEXTROAMPHETAMINE SACCHARATE, AMPHETAMINE ASPARTATE MONOHYDRATE, DEXTROAMPHETAMINE SULFATE AND AMPHETAMINE SULFATE 5; 5; 5; 5 MG/1; MG/1; MG/1; MG/1
20 CAPSULE, EXTENDED RELEASE ORAL EVERY MORNING
Qty: 30 CAPSULE | Refills: 0 | Status: SHIPPED | OUTPATIENT
Start: 2024-05-31 | End: 2024-06-30

## 2024-05-31 ASSESSMENT — ENCOUNTER SYMPTOMS
DIARRHEA: 0
NAUSEA: 0
VOMITING: 0
SHORTNESS OF BREATH: 0
COUGH: 0

## 2024-05-31 NOTE — PROGRESS NOTES
HPI Notes    Name: Alonzo Marinelli  : 1985         Chief Complaint:     Chief Complaint   Patient presents with    ADHD     1m f/u- Adderal 10mg qd.       History of Present Illness:        ADHD  This is a recurrent problem. The current episode started more than 1 year ago. The problem occurs every several days. The problem has been gradually improving. Pertinent negatives include no chest pain, chills, coughing, fever, headaches, nausea or vomiting. Treatments tried: adderall.       Past Medical History:     Past Medical History:   Diagnosis Date    Asthma     Chickenpox     Osteoarthritis     Tuberculosis       Reviewed all health maintenance requirements and ordered appropriate tests  Health Maintenance Due   Topic Date Due    Hepatitis B vaccine (1 of 3 - 3-dose series) Never done    Varicella vaccine (1 of 2 - 2-dose childhood series) Never done    HIV screen  Never done    Hepatitis C screen  Never done    Pneumococcal 0-64 years Vaccine (2 of 2 - PCV) 2018    COVID-19 Vaccine ( season) 2023    Lipids  Never done       Past Surgical History:     Past Surgical History:   Procedure Laterality Date    HERNIA REPAIR  1985    TONSILLECTOMY AND ADENOIDECTOMY          Medications:       Prior to Admission medications    Medication Sig Start Date End Date Taking? Authorizing Provider   amphetamine-dextroamphetamine (ADDERALL XR) 20 MG extended release capsule Take 1 capsule by mouth every morning for 30 days. Max Daily Amount: 20 mg 24 Yes Marlon Costa DNP   loratadine (CLARITIN) 10 MG tablet Take 1 tablet by mouth daily   Yes Provider, MD Roly   albuterol sulfate HFA (PROVENTIL;VENTOLIN;PROAIR) 108 (90 Base) MCG/ACT inhaler USE 2 INHALATIONS ORALLY   INTO THE LUNGS EVERY 6     HOURS AS NEEDED FOR        WHEEZING 24  Yes Marlon Costa DNP   fluticasone furoate-vilanterol (BREO ELLIPTA) 100-25 MCG/ACT inhaler Inhale 1 puff into the lungs daily

## 2024-07-03 DIAGNOSIS — F90.9 ADULT ADHD: ICD-10-CM

## 2024-07-03 RX ORDER — DEXTROAMPHETAMINE SACCHARATE, AMPHETAMINE ASPARTATE MONOHYDRATE, DEXTROAMPHETAMINE SULFATE AND AMPHETAMINE SULFATE 5; 5; 5; 5 MG/1; MG/1; MG/1; MG/1
20 CAPSULE, EXTENDED RELEASE ORAL EVERY MORNING
Qty: 30 CAPSULE | Refills: 0 | Status: SHIPPED | OUTPATIENT
Start: 2024-07-03 | End: 2024-08-02

## 2024-07-03 NOTE — TELEPHONE ENCOUNTER
Last OV: 5/31/2024  ADHD   Last RX:    Next scheduled apt: Visit date not found          Pt requesting a refill   Medication pending for approval

## 2024-07-03 NOTE — TELEPHONE ENCOUNTER
Clint @ [facility] calling to let us know that they only had 22 Adderall for Alonzo - patient is aware and will call when needs new script

## 2024-07-24 DIAGNOSIS — F90.9 ADULT ADHD: ICD-10-CM

## 2024-07-24 RX ORDER — DEXTROAMPHETAMINE SACCHARATE, AMPHETAMINE ASPARTATE MONOHYDRATE, DEXTROAMPHETAMINE SULFATE AND AMPHETAMINE SULFATE 5; 5; 5; 5 MG/1; MG/1; MG/1; MG/1
20 CAPSULE, EXTENDED RELEASE ORAL EVERY MORNING
Qty: 30 CAPSULE | Refills: 0 | Status: SHIPPED | OUTPATIENT
Start: 2024-07-24 | End: 2024-08-23

## 2024-07-24 NOTE — TELEPHONE ENCOUNTER
Alonzo called in on 7/3 and was only given 22 tablets of the adderall. He is going to be out. He needs a refill called into [facility].    Last visit 5/31/24    Adderall 20 mg    Wilbert    Would like to see if Dr. Merritt would call this in for him. He was out of it yesterday.      Health Maintenance   Topic Date Due    Hepatitis B vaccine (1 of 3 - 3-dose series) Never done    Varicella vaccine (1 of 2 - 2-dose childhood series) Never done    HIV screen  Never done    Hepatitis C screen  Never done    Pneumococcal 0-64 years Vaccine (2 of 2 - PCV) 06/30/2018    COVID-19 Vaccine (4 - 2023-24 season) 09/01/2023    Lipids  Never done    Flu vaccine (1) 08/01/2024    Depression Screen  02/16/2025    DTaP/Tdap/Td vaccine (2 - Td or Tdap) 04/19/2030    Hepatitis A vaccine  Aged Out    Hib vaccine  Aged Out    HPV vaccine  Aged Out    Polio vaccine  Aged Out    Meningococcal (ACWY) vaccine  Aged Out             (applicable per patient's age: Cancer Screenings, Depression Screening, Fall Risk Screening, Immunizations)    AST (U/L)   Date Value   02/02/2023 30     ALT (U/L)   Date Value   02/02/2023 42 (H)     BUN (mg/dL)   Date Value   02/02/2023 13      (goal A1C is < 7)   (goal LDL is <100) need 30-50% reduction from baseline     BP Readings from Last 3 Encounters:   05/31/24 (!) 158/108   04/30/24 (!) 144/98   03/26/24 138/88    (goal /80)      All Future Testing planned in [facility]:  Lab Frequency Next Occurrence       Next Visit Date:  No future appointments.         Patient Active Problem List:     Asthma     Environmental allergies

## 2024-08-12 RX ORDER — FLUTICASONE FUROATE AND VILANTEROL 100; 25 UG/1; UG/1
1 POWDER RESPIRATORY (INHALATION) DAILY
Qty: 3 EACH | Refills: 1 | Status: SHIPPED | OUTPATIENT
Start: 2024-08-12

## 2024-08-12 NOTE — TELEPHONE ENCOUNTER
Rx refill request via [employer]  Breo ellipta inhaler  Last OV for Asthma 4-30-24  Next appt NONE

## 2024-08-22 ENCOUNTER — TELEPHONE (OUTPATIENT)
Dept: FAMILY MEDICINE CLINIC | Age: 39
End: 2024-08-22

## 2024-08-22 DIAGNOSIS — F90.9 ADULT ADHD: Primary | ICD-10-CM

## 2024-08-22 RX ORDER — DEXTROAMPHETAMINE SACCHARATE, AMPHETAMINE ASPARTATE MONOHYDRATE, DEXTROAMPHETAMINE SULFATE AND AMPHETAMINE SULFATE 5; 5; 5; 5 MG/1; MG/1; MG/1; MG/1
20 CAPSULE, EXTENDED RELEASE ORAL EVERY MORNING
Qty: 30 CAPSULE | Refills: 0 | Status: SHIPPED | OUTPATIENT
Start: 2024-08-22 | End: 2024-09-21

## 2024-08-22 NOTE — TELEPHONE ENCOUNTER
Patient needs new script for Adderall 20 mg - also asking if he could also get 10 mg to add in the afternoon - leaves for work at 1:30 in the morning so he can tell that his 20 has wor off by early afternoon - states that it was discussed in one of his last visits - patient uses [facility] - I did check with [facility] and they have the 20 mg in name brand only

## 2024-08-22 NOTE — TELEPHONE ENCOUNTER
Will refill 20 mg only; he may call Monday when Mr. Costa is back, will defer addition of afternoon doses to him.

## 2024-09-27 DIAGNOSIS — F90.9 ADULT ADHD: ICD-10-CM

## 2024-09-27 RX ORDER — DEXTROAMPHETAMINE SACCHARATE, AMPHETAMINE ASPARTATE, DEXTROAMPHETAMINE SULFATE AND AMPHETAMINE SULFATE 2.5; 2.5; 2.5; 2.5 MG/1; MG/1; MG/1; MG/1
10 TABLET ORAL
Qty: 30 TABLET | Refills: 0 | OUTPATIENT
Start: 2024-09-27 | End: 2024-10-27

## 2024-09-27 RX ORDER — DEXTROAMPHETAMINE SACCHARATE, AMPHETAMINE ASPARTATE MONOHYDRATE, DEXTROAMPHETAMINE SULFATE AND AMPHETAMINE SULFATE 5; 5; 5; 5 MG/1; MG/1; MG/1; MG/1
20 CAPSULE, EXTENDED RELEASE ORAL EVERY MORNING
Qty: 30 CAPSULE | Refills: 0 | OUTPATIENT
Start: 2024-09-27 | End: 2024-10-27

## 2024-10-03 ENCOUNTER — OFFICE VISIT (OUTPATIENT)
Dept: FAMILY MEDICINE CLINIC | Age: 39
End: 2024-10-03
Payer: COMMERCIAL

## 2024-10-03 VITALS
BODY MASS INDEX: 36.97 KG/M2 | WEIGHT: 264.1 LBS | HEART RATE: 88 BPM | SYSTOLIC BLOOD PRESSURE: 142 MMHG | OXYGEN SATURATION: 96 % | DIASTOLIC BLOOD PRESSURE: 90 MMHG | HEIGHT: 71 IN

## 2024-10-03 DIAGNOSIS — F90.9 ADULT ADHD: Primary | ICD-10-CM

## 2024-10-03 PROCEDURE — 99213 OFFICE O/P EST LOW 20 MIN: CPT | Performed by: NURSE PRACTITIONER

## 2024-10-03 RX ORDER — METHYLPHENIDATE HYDROCHLORIDE 27 MG/1
27 TABLET ORAL EVERY MORNING
Qty: 30 TABLET | Refills: 0 | Status: SHIPPED | OUTPATIENT
Start: 2024-10-03 | End: 2024-11-02

## 2024-10-03 ASSESSMENT — ENCOUNTER SYMPTOMS
NAUSEA: 0
VOMITING: 0
SHORTNESS OF BREATH: 0
COUGH: 0
DIARRHEA: 0

## 2024-10-03 NOTE — PROGRESS NOTES
HPI Notes    Name: Alonzo Marinelli  : 1985         Chief Complaint:     Chief Complaint   Patient presents with    ADHD     3 month follow up. Patient currently only taking Adderall 10mg qd and states it doesn't seem to do anything with symptoms. Patient was not able to get the 20mg tabs due to shortage        History of Present Illness:        ADHD  This is a recurrent problem. The current episode started more than 1 month ago. The problem occurs intermittently. The problem has been waxing and waning. Pertinent negatives include no chest pain, chills, coughing, fever, headaches, nausea or vomiting. Nothing aggravates the symptoms.     Was on Adderall XR 20mg daily and doing well. Supply issues getting 20mg so pt was put on 10mg. Pt states that this dose is not effective.     Past Medical History:     Past Medical History:   Diagnosis Date    Asthma     Chickenpox     Osteoarthritis     Tuberculosis       Reviewed all health maintenance requirements and ordered appropriate tests  Health Maintenance Due   Topic Date Due    Varicella vaccine (1 of 2 - 13+ 2-dose series) Never done    HIV screen  Never done    Hepatitis C screen  Never done    Hepatitis B vaccine (1 of 3 - 19+ 3-dose series) Never done    Pneumococcal 0-64 years Vaccine (2 of 2 - PCV) 2018    Lipids  Never done    Flu vaccine (1) Never done    COVID-19 Vaccine ( season) 2024       Past Surgical History:     Past Surgical History:   Procedure Laterality Date    HERNIA REPAIR      TONSILLECTOMY AND ADENOIDECTOMY          Medications:       Prior to Admission medications    Medication Sig Start Date End Date Taking? Authorizing Provider   methylphenidate (CONCERTA) 27 MG extended release tablet Take 1 tablet by mouth every morning for 30 days. Max Daily Amount: 27 mg 10/3/24 11/2/24 Yes Marlon Costa DNP   amphetamine-dextroamphetamine (ADDERALL, 10MG,) 10 MG tablet Take 1 tablet by mouth Daily with lunch for 30

## 2024-10-04 ENCOUNTER — TELEPHONE (OUTPATIENT)
Dept: FAMILY MEDICINE CLINIC | Age: 39
End: 2024-10-04

## 2024-10-04 DIAGNOSIS — F90.9 ADULT ADHD: Primary | ICD-10-CM

## 2024-10-04 RX ORDER — DEXMETHYLPHENIDATE HYDROCHLORIDE 10 MG/1
10 CAPSULE, EXTENDED RELEASE ORAL DAILY
Qty: 30 CAPSULE | Refills: 0 | Status: SHIPPED | OUTPATIENT
Start: 2024-10-04 | End: 2024-11-03

## 2024-10-04 NOTE — TELEPHONE ENCOUNTER
PA request for concerta 27mg was denied. Preferred drugs per insurance plan is amphetamine-dextroamphetamine mixed salts ext-rel, dexmethylphenidate ext-rel, lisdexamfetamine, methylphenidate ext rel, azstarys.

## 2024-11-07 ENCOUNTER — OFFICE VISIT (OUTPATIENT)
Dept: FAMILY MEDICINE CLINIC | Age: 39
End: 2024-11-07
Payer: COMMERCIAL

## 2024-11-07 VITALS — OXYGEN SATURATION: 94 % | HEART RATE: 73 BPM | DIASTOLIC BLOOD PRESSURE: 80 MMHG | SYSTOLIC BLOOD PRESSURE: 136 MMHG

## 2024-11-07 DIAGNOSIS — L73.9 FOLLICULITIS: ICD-10-CM

## 2024-11-07 DIAGNOSIS — F90.9 ADULT ADHD: Primary | ICD-10-CM

## 2024-11-07 PROCEDURE — 99213 OFFICE O/P EST LOW 20 MIN: CPT | Performed by: NURSE PRACTITIONER

## 2024-11-07 RX ORDER — DEXMETHYLPHENIDATE HYDROCHLORIDE 20 MG/1
20 CAPSULE, EXTENDED RELEASE ORAL DAILY
Qty: 30 CAPSULE | Refills: 0 | Status: SHIPPED | OUTPATIENT
Start: 2024-11-07 | End: 2024-12-07

## 2024-11-07 RX ORDER — TRIAMCINOLONE ACETONIDE 5 MG/G
CREAM TOPICAL 2 TIMES DAILY
Qty: 30 G | Refills: 2 | Status: SHIPPED | OUTPATIENT
Start: 2024-11-07

## 2024-11-07 ASSESSMENT — ENCOUNTER SYMPTOMS
SHORTNESS OF BREATH: 0
COUGH: 0
VOMITING: 0
DIARRHEA: 0
NAUSEA: 0

## 2024-11-07 NOTE — PROGRESS NOTES
HPI Notes    Name: Alonzo Marinelli  : 1985         Chief Complaint:     Chief Complaint   Patient presents with    ADHD     1 month follow up- concerta 27mg. Patient states he's only been taking   Dexmethylphenidate HCl ER 10 MG         History of Present Illness:        ADHD  This is a recurrent problem. The current episode started more than 1 month ago. The problem occurs daily. The problem has been waxing and waning. Associated symptoms include a rash. Pertinent negatives include no chest pain, chills, coughing, fever, headaches, nausea or vomiting. Nothing aggravates the symptoms.   Rash  This is a new problem. The current episode started 1 to 4 weeks ago. The problem has been waxing and waning since onset. Location: posterior neck. The rash is characterized by redness and blistering (small pimples). He was exposed to nothing. Pertinent negatives include no cough, diarrhea, fever, shortness of breath or vomiting. Past treatments include nothing.       Past Medical History:     Past Medical History:   Diagnosis Date    Asthma     Chickenpox     Osteoarthritis     Tuberculosis       Reviewed all health maintenance requirements and ordered appropriate tests  Health Maintenance Due   Topic Date Due    Varicella vaccine (1 of 2 - 13+ 2-dose series) Never done    HIV screen  Never done    Hepatitis C screen  Never done    Hepatitis B vaccine (1 of 3 - 19+ 3-dose series) Never done    Pneumococcal 0-64 years Vaccine (2 of 2 - PCV) 2018    Lipids  Never done    Flu vaccine (1) Never done    COVID-19 Vaccine ( - - season) 2024       Past Surgical History:     Past Surgical History:   Procedure Laterality Date    HERNIA REPAIR      TONSILLECTOMY AND ADENOIDECTOMY          Medications:       Prior to Admission medications    Medication Sig Start Date End Date Taking? Authorizing Provider   Dexmethylphenidate HCl ER 20 MG CP24 Take 1 capsule by mouth daily for 30 days. Max Daily Amount: 20 mg

## 2024-12-13 ENCOUNTER — OFFICE VISIT (OUTPATIENT)
Dept: FAMILY MEDICINE CLINIC | Age: 39
End: 2024-12-13
Payer: COMMERCIAL

## 2024-12-13 VITALS — OXYGEN SATURATION: 96 % | SYSTOLIC BLOOD PRESSURE: 138 MMHG | DIASTOLIC BLOOD PRESSURE: 96 MMHG | HEART RATE: 87 BPM

## 2024-12-13 DIAGNOSIS — J45.30 MILD PERSISTENT ASTHMA WITHOUT COMPLICATION: ICD-10-CM

## 2024-12-13 DIAGNOSIS — F90.9 ADULT ADHD: Primary | ICD-10-CM

## 2024-12-13 PROCEDURE — 99214 OFFICE O/P EST MOD 30 MIN: CPT | Performed by: NURSE PRACTITIONER

## 2024-12-13 RX ORDER — METHYLPHENIDATE HYDROCHLORIDE EXTENDED RELEASE 20 MG/1
20 TABLET ORAL EVERY MORNING
Qty: 30 TABLET | Refills: 0 | Status: SHIPPED | OUTPATIENT
Start: 2024-12-13 | End: 2025-01-12

## 2024-12-13 RX ORDER — FLUTICASONE FUROATE AND VILANTEROL 100; 25 UG/1; UG/1
1 POWDER RESPIRATORY (INHALATION) DAILY
Qty: 3 EACH | Refills: 2 | Status: SHIPPED | OUTPATIENT
Start: 2024-12-13

## 2024-12-13 ASSESSMENT — ENCOUNTER SYMPTOMS
DIFFICULTY BREATHING: 0
NAUSEA: 0
COUGH: 0
VOMITING: 0
DIARRHEA: 0
SHORTNESS OF BREATH: 0

## 2024-12-13 NOTE — PROGRESS NOTES
ADENOIDECTOMY          Medications:       Prior to Admission medications    Medication Sig Start Date End Date Taking? Authorizing Provider   fluticasone furoate-vilanterol (BREO ELLIPTA) 100-25 MCG/ACT inhaler Inhale 1 puff into the lungs daily 12/13/24  Yes Marlon Costa DNP   methylphenidate (METADATE ER) 20 MG extended release tablet Take 1 tablet by mouth every morning for 30 days. Max Daily Amount: 20 mg 12/13/24 1/12/25 Yes Marlon Costa DNP   Dexmethylphenidate HCl ER 20 MG CP24 Take 1 capsule by mouth daily for 30 days. Max Daily Amount: 20 mg 11/7/24 12/13/24 Yes Marlon Costa DNP   triamcinolone (ARISTOCORT) 0.5 % cream Apply topically 2 times daily 11/7/24  Yes Marlon Costa DNP   loratadine (CLARITIN) 10 MG tablet Take 1 tablet by mouth daily   Yes Provider, MD Roly   albuterol sulfate HFA (PROVENTIL;VENTOLIN;PROAIR) 108 (90 Base) MCG/ACT inhaler USE 2 INHALATIONS ORALLY   INTO THE LUNGS EVERY 6     HOURS AS NEEDED FOR        WHEEZING 4/30/24  Yes Marlon Costa DNP   albuterol (PROVENTIL) (2.5 MG/3ML) 0.083% nebulizer solution Take 3 mLs by nebulization every 4 hours as needed for Wheezing 3/8/23  Yes Tasha Merritt MD   montelukast (SINGULAIR) 10 MG tablet Take 1 tablet by mouth nightly 2/10/23  Yes Marlon Costa DNP   fluticasone (FLONASE) 50 MCG/ACT nasal spray 1 spray by Each Nostril route daily 3/12/20  Yes Aries Shearer DO        Allergies:       Patient has no known allergies.    Social History:     Tobacco:    reports that he has never smoked. He has quit using smokeless tobacco.  His smokeless tobacco use included chew.  Alcohol:      reports current alcohol use.  Drug Use:  reports no history of drug use.    Family History:     Family History   Adopted: Yes       Review of Systems:         Review of Systems   Constitutional:  Negative for chills and fever.   Respiratory:  Negative for cough and shortness of breath.    Cardiovascular:

## 2025-01-13 ENCOUNTER — OFFICE VISIT (OUTPATIENT)
Dept: FAMILY MEDICINE CLINIC | Age: 40
End: 2025-01-13
Payer: COMMERCIAL

## 2025-01-13 VITALS
DIASTOLIC BLOOD PRESSURE: 88 MMHG | SYSTOLIC BLOOD PRESSURE: 138 MMHG | WEIGHT: 269 LBS | HEART RATE: 71 BPM | OXYGEN SATURATION: 95 % | BODY MASS INDEX: 37.66 KG/M2 | HEIGHT: 71 IN

## 2025-01-13 DIAGNOSIS — F90.9 ADULT ADHD: ICD-10-CM

## 2025-01-13 PROCEDURE — 99213 OFFICE O/P EST LOW 20 MIN: CPT | Performed by: NURSE PRACTITIONER

## 2025-01-13 RX ORDER — METHYLPHENIDATE HYDROCHLORIDE 30 MG/1
30 CAPSULE, EXTENDED RELEASE ORAL DAILY
Qty: 30 CAPSULE | Refills: 0 | Status: SHIPPED | OUTPATIENT
Start: 2025-01-13 | End: 2025-01-13 | Stop reason: SDUPTHER

## 2025-01-13 RX ORDER — METHYLPHENIDATE HYDROCHLORIDE 30 MG/1
30 CAPSULE, EXTENDED RELEASE ORAL DAILY
Qty: 30 CAPSULE | Refills: 0 | Status: SHIPPED | OUTPATIENT
Start: 2025-01-13 | End: 2025-02-12

## 2025-01-13 SDOH — ECONOMIC STABILITY: FOOD INSECURITY: WITHIN THE PAST 12 MONTHS, YOU WORRIED THAT YOUR FOOD WOULD RUN OUT BEFORE YOU GOT MONEY TO BUY MORE.: NEVER TRUE

## 2025-01-13 SDOH — ECONOMIC STABILITY: FOOD INSECURITY: WITHIN THE PAST 12 MONTHS, THE FOOD YOU BOUGHT JUST DIDN'T LAST AND YOU DIDN'T HAVE MONEY TO GET MORE.: NEVER TRUE

## 2025-01-13 ASSESSMENT — PATIENT HEALTH QUESTIONNAIRE - PHQ9
2. FEELING DOWN, DEPRESSED OR HOPELESS: NOT AT ALL
SUM OF ALL RESPONSES TO PHQ9 QUESTIONS 1 & 2: 0
SUM OF ALL RESPONSES TO PHQ QUESTIONS 1-9: 0
1. LITTLE INTEREST OR PLEASURE IN DOING THINGS: NOT AT ALL

## 2025-01-13 ASSESSMENT — ENCOUNTER SYMPTOMS
VOMITING: 0
DIARRHEA: 0
COUGH: 0
SHORTNESS OF BREATH: 0
NAUSEA: 0

## 2025-01-13 NOTE — PROGRESS NOTES
were placed in this encounter.        Patient Instructions     SURVEY:    You may be receiving a survey from [facility] regarding your visit today.    Please complete the survey to enable us to provide the highest quality of care to you and your family.    If you cannot score us a very good on any question, please call the office to discuss how we could have made your experience a very good one.    Thank you.     Electronically signed by Marlon Costa DNP,APRN,CNP  on 1/13/2025 at 3:33 PM           Completed Refills   Requested Prescriptions     Signed Prescriptions Disp Refills    methylphenidate (METADATE CD) 30 MG extended release capsule 30 capsule 0     Sig: Take 1 capsule by mouth daily for 30 days. Max Daily Amount: 30 mg

## 2025-01-14 ENCOUNTER — TELEPHONE (OUTPATIENT)
Dept: FAMILY MEDICINE CLINIC | Age: 40
End: 2025-01-14

## 2025-01-14 NOTE — TELEPHONE ENCOUNTER
Received Fax from [facility] stating Methylphenid 30mg qd is Not avail ible.     Can something else take it's place?

## 2025-01-15 NOTE — TELEPHONE ENCOUNTER
[facility] They said ordered was received and is being processed to be sent to member, she's unsure why we received a \"not available\" fax but Pt should be receiving his medication.

## 2025-02-13 ENCOUNTER — OFFICE VISIT (OUTPATIENT)
Dept: FAMILY MEDICINE CLINIC | Age: 40
End: 2025-02-13
Payer: COMMERCIAL

## 2025-02-13 VITALS
TEMPERATURE: 98 F | OXYGEN SATURATION: 96 % | HEART RATE: 87 BPM | DIASTOLIC BLOOD PRESSURE: 88 MMHG | SYSTOLIC BLOOD PRESSURE: 138 MMHG

## 2025-02-13 DIAGNOSIS — J06.9 VIRAL URI: Primary | ICD-10-CM

## 2025-02-13 DIAGNOSIS — R09.82 PND (POST-NASAL DRIP): ICD-10-CM

## 2025-02-13 PROCEDURE — 99213 OFFICE O/P EST LOW 20 MIN: CPT | Performed by: NURSE PRACTITIONER

## 2025-02-13 ASSESSMENT — ENCOUNTER SYMPTOMS
COUGH: 1
SHORTNESS OF BREATH: 0
DIARRHEA: 0
NAUSEA: 0
VOMITING: 0
SORE THROAT: 1
SINUS PAIN: 1
RHINORRHEA: 1

## 2025-02-13 NOTE — PROGRESS NOTES
HPI Notes    Name: Alonzo Marinelli  : 1985         Chief Complaint:     Chief Complaint   Patient presents with    Cold Symptoms     Cough, congestions x 2 days. Intermittent SOB. Patient denies fever/chills/bodyaches.       History of Present Illness:        Cold Symptoms   This is a new problem. The current episode started in the past 7 days. The problem has been waxing and waning. There has been no fever. Associated symptoms include congestion, coughing, headaches, joint pain, rhinorrhea, sinus pain and a sore throat. Pertinent negatives include no chest pain, diarrhea, nausea, neck pain or vomiting.       Past Medical History:     Past Medical History:   Diagnosis Date    Asthma     Chickenpox     Osteoarthritis     Tuberculosis       Reviewed all health maintenance requirements and ordered appropriate tests  Health Maintenance Due   Topic Date Due    Varicella vaccine (1 of  - 13+ 2-dose series) Never done    HIV screen  Never done    Hepatitis C screen  Never done    Hepatitis B vaccine (1 of 3 - 19+ 3-dose series) Never done    Pneumococcal 0-49 years Vaccine (2 of 2 - PCV) 2018    Lipids  Never done    Flu vaccine (1) Never done    COVID-19 Vaccine (2024- season) 2024       Past Surgical History:     Past Surgical History:   Procedure Laterality Date    HERNIA REPAIR  1985    TONSILLECTOMY AND ADENOIDECTOMY          Medications:       Prior to Admission medications    Medication Sig Start Date End Date Taking? Authorizing Provider   methylphenidate (METADATE CD) 30 MG extended release capsule Take 1 capsule by mouth daily for 30 days. Max Daily Amount: 30 mg 25 Yes Marlon Costa DNP   fluticasone furoate-vilanterol (BREO ELLIPTA) 100-25 MCG/ACT inhaler Inhale 1 puff into the lungs daily 24  Yes Marlon Costa DNP   triamcinolone (ARISTOCORT) 0.5 % cream Apply topically 2 times daily 24  Yes Marlon Costa DNP   loratadine (CLARITIN) 10 MG

## 2025-02-24 DIAGNOSIS — F90.9 ADULT ADHD: ICD-10-CM

## 2025-02-24 RX ORDER — METHYLPHENIDATE HYDROCHLORIDE 30 MG/1
30 CAPSULE, EXTENDED RELEASE ORAL DAILY
Qty: 30 CAPSULE | Refills: 0 | Status: SHIPPED | OUTPATIENT
Start: 2025-02-24 | End: 2025-02-26 | Stop reason: SDUPTHER

## 2025-02-24 NOTE — TELEPHONE ENCOUNTER
Patient asking for a new script for Adderall - I am seeing   methylphenidate - patient would like to try [facility] - if they don't have it [facility] is second choice    Health Maintenance   Topic Date Due    Varicella vaccine (1 of 2 - 13+ 2-dose series) Never done    HIV screen  Never done    Hepatitis C screen  Never done    Hepatitis B vaccine (1 of 3 - 19+ 3-dose series) Never done    Pneumococcal 0-49 years Vaccine (2 of 2 - PCV) 06/30/2018    Lipids  Never done    Flu vaccine (1) Never done    COVID-19 Vaccine (4 - 2024-25 season) 09/01/2024    Depression Screen  01/13/2026    DTaP/Tdap/Td vaccine (2 - Td or Tdap) 04/19/2030    Hepatitis A vaccine  Aged Out    Hib vaccine  Aged Out    HPV vaccine  Aged Out    Polio vaccine  Aged Out    Meningococcal (ACWY) vaccine  Aged Out             (applicable per patient's age: Cancer Screenings, Depression Screening, Fall Risk Screening, Immunizations)    AST (U/L)   Date Value   02/02/2023 30     ALT (U/L)   Date Value   02/02/2023 42 (H)     BUN (mg/dL)   Date Value   02/02/2023 13      (goal A1C is < 7)   (goal LDL is <100) need 30-50% reduction from baseline     BP Readings from Last 3 Encounters:   02/13/25 138/88   01/13/25 138/88   12/13/24 (!) 138/96    (goal /80)      All Future Testing planned in [facility]:  Lab Frequency Next Occurrence   POC COVID-19, FLU A/B Once 02/13/2025       Next Visit Date:  No future appointments.         Patient Active Problem List:     Asthma     Environmental allergies

## 2025-02-26 NOTE — TELEPHONE ENCOUNTER
[facility] not have this medication - could we send to [facility] for him

## 2025-02-27 RX ORDER — METHYLPHENIDATE HYDROCHLORIDE 30 MG/1
30 CAPSULE, EXTENDED RELEASE ORAL DAILY
Qty: 30 CAPSULE | Refills: 0 | Status: SHIPPED | OUTPATIENT
Start: 2025-02-27 | End: 2025-03-29

## 2025-03-28 DIAGNOSIS — F90.9 ADULT ADHD: ICD-10-CM

## 2025-03-28 RX ORDER — METHYLPHENIDATE HYDROCHLORIDE 30 MG/1
30 CAPSULE, EXTENDED RELEASE ORAL DAILY
Qty: 30 CAPSULE | Refills: 0 | Status: SHIPPED | OUTPATIENT
Start: 2025-03-28 | End: 2025-04-27

## 2025-03-28 NOTE — TELEPHONE ENCOUNTER
Methylphenidate 30 mg    [facility]    Last check up 1/13/25    Health Maintenance   Topic Date Due    Varicella vaccine (1 of 2 - 13+ 2-dose series) Never done    HIV screen  Never done    Hepatitis C screen  Never done    Hepatitis B vaccine (1 of 3 - 19+ 3-dose series) Never done    Pneumococcal 0-49 years Vaccine (2 of 2 - PCV) 06/30/2018    Lipids  Never done    Flu vaccine (1) Never done    COVID-19 Vaccine (4 - 2024-25 season) 09/01/2024    Depression Screen  01/13/2026    DTaP/Tdap/Td vaccine (2 - Td or Tdap) 04/19/2030    Hepatitis A vaccine  Aged Out    Hib vaccine  Aged Out    HPV vaccine  Aged Out    Polio vaccine  Aged Out    Meningococcal (ACWY) vaccine  Aged Out    Meningococcal B vaccine  Aged Out             (applicable per patient's age: Cancer Screenings, Depression Screening, Fall Risk Screening, Immunizations)    AST (U/L)   Date Value   02/02/2023 30     ALT (U/L)   Date Value   02/02/2023 42 (H)     BUN (mg/dL)   Date Value   02/02/2023 13      (goal A1C is < 7)   (goal LDL is <100) need 30-50% reduction from baseline     BP Readings from Last 3 Encounters:   02/13/25 138/88   01/13/25 138/88   12/13/24 (!) 138/96    (goal /80)      All Future Testing planned in [facility]:  Lab Frequency Next Occurrence       Next Visit Date:  No future appointments.         Patient Active Problem List:     Asthma     Environmental allergies

## 2025-03-28 NOTE — TELEPHONE ENCOUNTER
Last OV: 2/13/2025 URI  01/13/25 ADHD   Last RX:    Next scheduled apt: Visit date not found        Pt requesting a refill   Medication pending for approval

## 2025-05-03 DIAGNOSIS — F90.9 ADULT ADHD: ICD-10-CM

## 2025-05-05 RX ORDER — METHYLPHENIDATE HYDROCHLORIDE 36 MG/1
36 TABLET, EXTENDED RELEASE ORAL DAILY
Qty: 30 TABLET | Refills: 0 | Status: SHIPPED | OUTPATIENT
Start: 2025-05-05 | End: 2025-06-04

## 2025-05-05 RX ORDER — FLUTICASONE FUROATE AND VILANTEROL 100; 25 UG/1; UG/1
1 POWDER RESPIRATORY (INHALATION) DAILY
Qty: 3 EACH | Refills: 2 | Status: SHIPPED | OUTPATIENT
Start: 2025-05-05

## 2025-05-05 NOTE — TELEPHONE ENCOUNTER
Last visit:  1/13/2025  Next Visit Date:  No future appointments.      Medication List:  Prior to Admission medications    Medication Sig Start Date End Date Taking? Authorizing Provider   methylphenidate 36 MG CR tablet Take 1 tablet by mouth daily for 30 days. Max Daily Amount: 36 mg 4/1/25 5/1/25  Marlon Costa DNP   fluticasone furoate-vilanterol (BREO ELLIPTA) 100-25 MCG/ACT inhaler Inhale 1 puff into the lungs daily 12/13/24   Marlon Costa DNP   triamcinolone (ARISTOCORT) 0.5 % cream Apply topically 2 times daily 11/7/24   Marlon Costa DNP   loratadine (CLARITIN) 10 MG tablet Take 1 tablet by mouth daily    ProviderRoly MD   albuterol sulfate HFA (PROVENTIL;VENTOLIN;PROAIR) 108 (90 Base) MCG/ACT inhaler USE 2 INHALATIONS ORALLY   INTO THE LUNGS EVERY 6     HOURS AS NEEDED FOR        WHEEZING 4/30/24   Marlon Costa DNP   albuterol (PROVENTIL) (2.5 MG/3ML) 0.083% nebulizer solution Take 3 mLs by nebulization every 4 hours as needed for Wheezing 3/8/23   Tasha Merritt MD   montelukast (SINGULAIR) 10 MG tablet Take 1 tablet by mouth nightly 2/10/23   Marlon Costa DNP   fluticasone (FLONASE) 50 MCG/ACT nasal spray 1 spray by Each Nostril route daily 3/12/20   Aries Shearer DO             Patient is requesting refill of breo inhaler and methylphenidate to [facility]. Medication pending approval.

## 2025-06-09 DIAGNOSIS — F90.9 ADULT ADHD: ICD-10-CM

## 2025-06-09 RX ORDER — METHYLPHENIDATE HYDROCHLORIDE 36 MG/1
36 TABLET, EXTENDED RELEASE ORAL DAILY
Qty: 30 TABLET | Refills: 0 | Status: SHIPPED | OUTPATIENT
Start: 2025-06-09 | End: 2025-07-09

## 2025-06-09 NOTE — TELEPHONE ENCOUNTER
Last visit:  2/13/2025  Next Visit Date:  No future appointments.      Medication List:  Prior to Admission medications    Medication Sig Start Date End Date Taking? Authorizing Provider   fluticasone furoate-vilanterol (BREO ELLIPTA) 100-25 MCG/ACT inhaler Inhale 1 puff into the lungs daily 5/5/25   Marlon Costa DNP   methylphenidate 36 MG CR tablet Take 1 tablet by mouth daily for 30 days. Max Daily Amount: 36 mg 5/5/25 6/4/25  Marlon Costa DNP   triamcinolone (ARISTOCORT) 0.5 % cream Apply topically 2 times daily 11/7/24   Marlon Costa DNP   loratadine (CLARITIN) 10 MG tablet Take 1 tablet by mouth daily    ProviderRoly MD   albuterol sulfate HFA (PROVENTIL;VENTOLIN;PROAIR) 108 (90 Base) MCG/ACT inhaler USE 2 INHALATIONS ORALLY   INTO THE LUNGS EVERY 6     HOURS AS NEEDED FOR        WHEEZING 4/30/24   Marlon Costa DNP   albuterol (PROVENTIL) (2.5 MG/3ML) 0.083% nebulizer solution Take 3 mLs by nebulization every 4 hours as needed for Wheezing 3/8/23   Tasha Merritt MD   montelukast (SINGULAIR) 10 MG tablet Take 1 tablet by mouth nightly 2/10/23   Marlon Costa DNP   fluticasone (FLONASE) 50 MCG/ACT nasal spray 1 spray by Each Nostril route daily 3/12/20   Aries Shearer DO             Patient is requesting refill of methylphenidate 36mg. Send to [facility]. Medication is pending approval

## 2025-07-19 DIAGNOSIS — J45.30 MILD PERSISTENT ASTHMA WITHOUT COMPLICATION: Primary | ICD-10-CM

## 2025-07-19 DIAGNOSIS — F90.9 ADULT ADHD: ICD-10-CM

## 2025-07-21 RX ORDER — METHYLPHENIDATE HYDROCHLORIDE 36 MG/1
36 TABLET, EXTENDED RELEASE ORAL DAILY
Qty: 30 TABLET | Refills: 0 | Status: SHIPPED | OUTPATIENT
Start: 2025-07-21 | End: 2025-08-20

## 2025-07-21 RX ORDER — FLUTICASONE FUROATE AND VILANTEROL 100; 25 UG/1; UG/1
1 POWDER RESPIRATORY (INHALATION) DAILY
Qty: 3 EACH | Refills: 2 | Status: SHIPPED | OUTPATIENT
Start: 2025-07-21

## 2025-07-21 RX ORDER — ALBUTEROL SULFATE 90 UG/1
INHALANT RESPIRATORY (INHALATION)
Qty: 3 EACH | Refills: 1 | Status: SHIPPED | OUTPATIENT
Start: 2025-07-21

## 2025-07-21 NOTE — TELEPHONE ENCOUNTER
Last visit:  2/13/2025 (Viral URI- Acute care)     01/13/2025- (ADHD)   Next Visit Date:    Future Appointments   Date Time Provider Department Center   7/28/2025  8:40 AM Marlon Costa DNP [facility] DEP         Medication List:  Prior to Admission medications    Medication Sig Start Date End Date Taking? Authorizing Provider   methylphenidate 36 MG CR tablet Take 1 tablet by mouth daily for 30 days. Max Daily Amount: 36 mg 6/9/25 7/9/25  Marlon Costa DNP   fluticasone furoate-vilanterol (BREO ELLIPTA) 100-25 MCG/ACT inhaler Inhale 1 puff into the lungs daily 5/5/25   Marlon Costa DNP   triamcinolone (ARISTOCORT) 0.5 % cream Apply topically 2 times daily 11/7/24   Marlon Costa DNP   loratadine (CLARITIN) 10 MG tablet Take 1 tablet by mouth daily    Provider, MD Roly   albuterol sulfate HFA (PROVENTIL;VENTOLIN;PROAIR) 108 (90 Base) MCG/ACT inhaler USE 2 INHALATIONS ORALLY   INTO THE LUNGS EVERY 6     HOURS AS NEEDED FOR        WHEEZING 4/30/24   Marlon Costa DNP   albuterol (PROVENTIL) (2.5 MG/3ML) 0.083% nebulizer solution Take 3 mLs by nebulization every 4 hours as needed for Wheezing 3/8/23   Tasha Merritt MD   montelukast (SINGULAIR) 10 MG tablet Take 1 tablet by mouth nightly 2/10/23   Marlon Costa DNP   fluticasone (FLONASE) 50 MCG/ACT nasal spray 1 spray by Each Nostril route daily 3/12/20   Aries Shearer DO             Pt is requesting refill of albuterol sulfate, fluticasone furoate-vilanterol inhalers and methylphenidate. Medication is pending approval.

## 2025-07-28 ENCOUNTER — HOSPITAL ENCOUNTER (OUTPATIENT)
Dept: GENERAL RADIOLOGY | Age: 40
Discharge: HOME | End: 2025-07-30
Payer: COMMERCIAL

## 2025-07-28 ENCOUNTER — OFFICE VISIT (OUTPATIENT)
Dept: FAMILY MEDICINE CLINIC | Age: 40
End: 2025-07-28
Payer: COMMERCIAL

## 2025-07-28 VITALS
BODY MASS INDEX: 36.82 KG/M2 | OXYGEN SATURATION: 95 % | HEIGHT: 71 IN | SYSTOLIC BLOOD PRESSURE: 128 MMHG | WEIGHT: 263 LBS | HEART RATE: 75 BPM | DIASTOLIC BLOOD PRESSURE: 86 MMHG

## 2025-07-28 DIAGNOSIS — F90.9 ADULT ADHD: Primary | ICD-10-CM

## 2025-07-28 DIAGNOSIS — G89.29 CHRONIC PAIN OF RIGHT KNEE: ICD-10-CM

## 2025-07-28 DIAGNOSIS — M25.561 CHRONIC PAIN OF RIGHT KNEE: ICD-10-CM

## 2025-07-28 PROCEDURE — 73564 X-RAY EXAM KNEE 4 OR MORE: CPT

## 2025-07-28 PROCEDURE — 99214 OFFICE O/P EST MOD 30 MIN: CPT | Performed by: NURSE PRACTITIONER

## 2025-07-28 ASSESSMENT — ENCOUNTER SYMPTOMS
NAUSEA: 0
DIARRHEA: 0
SHORTNESS OF BREATH: 0
VOMITING: 0
COUGH: 0

## 2025-07-28 ASSESSMENT — PATIENT HEALTH QUESTIONNAIRE - PHQ9
1. LITTLE INTEREST OR PLEASURE IN DOING THINGS: NOT AT ALL
SUM OF ALL RESPONSES TO PHQ QUESTIONS 1-9: 0
2. FEELING DOWN, DEPRESSED OR HOPELESS: NOT AT ALL
SUM OF ALL RESPONSES TO PHQ QUESTIONS 1-9: 0

## 2025-07-28 NOTE — PROGRESS NOTES
HPI Notes    Name: Alonzo Marinelli  : 1985         Chief Complaint:     Chief Complaint   Patient presents with    ADHD     Taking methylphenidate 36mg CR            History of Present Illness:        HPI  Pt is a 40 yo that reports for routine office visit for ADHD. Pt is on methylphenidate 36mg. Symptoms well controlled. Denies side effects.     Complains of right knee pain that started in 2025. Pt feels a bony abnormality to the right lateral knee. States he is still able to run on it.  Past Medical History:     Past Medical History:   Diagnosis Date    Asthma     Chickenpox     Osteoarthritis     Tuberculosis       Reviewed all health maintenance requirements and ordered appropriate tests  Health Maintenance Due   Topic Date Due    Varicella vaccine (1 of 2 - 13+ 2-dose series) Never done    HIV screen  Never done    Hepatitis C screen  Never done    Hepatitis B vaccine (1 of 3 - 19+ 3-dose series) Never done    Pneumococcal 0-49 years Vaccine (2 of 2 - PCV) 2018    Lipids  Never done    COVID-19 Vaccine (2024- season) 2024       Past Surgical History:     Past Surgical History:   Procedure Laterality Date    HERNIA REPAIR  1985    TONSILLECTOMY AND ADENOIDECTOMY          Medications:       Prior to Admission medications    Medication Sig Start Date End Date Taking? Authorizing Provider   albuterol sulfate HFA (PROVENTIL;VENTOLIN;PROAIR) 108 (90 Base) MCG/ACT inhaler USE 2 INHALATIONS ORALLY   INTO THE LUNGS EVERY 6     HOURS AS NEEDED FOR        WHEEZING 25  Yes Marlon Costa DNP   fluticasone furoate-vilanterol (BREO ELLIPTA) 100-25 MCG/ACT inhaler Inhale 1 puff into the lungs daily 25  Yes Marlon Costa DNP   methylphenidate 36 MG CR tablet Take 1 tablet by mouth daily for 30 days. Max Daily Amount: 36 mg 25 Yes Marlon Costa DNP   triamcinolone (ARISTOCORT) 0.5 % cream Apply topically 2 times daily 24  Yes Marlon Costa DNP

## 2025-07-28 NOTE — PATIENT INSTRUCTIONS
SURVEY:    You may be receiving a [facility] today.    You may get this in the mail, through your MyChart or in your email.     Please complete the survey to enable us to provide the highest quality of care to you and your family.      Thank you.    Clinical Care Team:         ELIZABETH French-RANJIT Back                         Triage:         RANJIT Roche    Clerical Team:      Raine Ladd       [place] [phone]           Billing questions: [phone]           Medical Records Request: [phone]

## 2025-09-05 DIAGNOSIS — F90.9 ADULT ADHD: ICD-10-CM

## 2025-09-05 RX ORDER — METHYLPHENIDATE HYDROCHLORIDE 36 MG/1
36 TABLET, EXTENDED RELEASE ORAL DAILY
Qty: 30 TABLET | Refills: 0 | Status: SHIPPED | OUTPATIENT
Start: 2025-09-05 | End: 2025-10-05